# Patient Record
Sex: FEMALE | Race: WHITE | Employment: FULL TIME | URBAN - METROPOLITAN AREA
[De-identification: names, ages, dates, MRNs, and addresses within clinical notes are randomized per-mention and may not be internally consistent; named-entity substitution may affect disease eponyms.]

---

## 2022-08-05 ENCOUNTER — OFFICE VISIT (OUTPATIENT)
Dept: OBGYN CLINIC | Age: 32
End: 2022-08-05
Payer: COMMERCIAL

## 2022-08-05 VITALS
WEIGHT: 199 LBS | SYSTOLIC BLOOD PRESSURE: 110 MMHG | HEIGHT: 62 IN | DIASTOLIC BLOOD PRESSURE: 80 MMHG | BODY MASS INDEX: 36.62 KG/M2

## 2022-08-05 DIAGNOSIS — Z01.419 WOMEN'S ANNUAL ROUTINE GYNECOLOGICAL EXAMINATION: Primary | ICD-10-CM

## 2022-08-05 DIAGNOSIS — E28.2 PCOS (POLYCYSTIC OVARIAN SYNDROME): ICD-10-CM

## 2022-08-05 DIAGNOSIS — B00.9 HSV-2 (HERPES SIMPLEX VIRUS 2) INFECTION: ICD-10-CM

## 2022-08-05 PROCEDURE — 99213 OFFICE O/P EST LOW 20 MIN: CPT | Performed by: OBSTETRICS & GYNECOLOGY

## 2022-08-05 PROCEDURE — 99385 PREV VISIT NEW AGE 18-39: CPT | Performed by: OBSTETRICS & GYNECOLOGY

## 2022-08-05 RX ORDER — FAMOTIDINE 10 MG
TABLET ORAL
COMMUNITY

## 2022-08-05 RX ORDER — CHOLECALCIFEROL (VITAMIN D3) 25 MCG
TABLET ORAL
COMMUNITY

## 2022-08-05 RX ORDER — VALACYCLOVIR HYDROCHLORIDE 500 MG/1
500 TABLET, FILM COATED ORAL 2 TIMES DAILY
Qty: 6 TABLET | Refills: 3 | Status: SHIPPED | OUTPATIENT
Start: 2022-08-05

## 2022-08-05 ASSESSMENT — PATIENT HEALTH QUESTIONNAIRE - PHQ9
2. FEELING DOWN, DEPRESSED OR HOPELESS: 0
SUM OF ALL RESPONSES TO PHQ QUESTIONS 1-9: 0
SUM OF ALL RESPONSES TO PHQ9 QUESTIONS 1 & 2: 0
1. LITTLE INTEREST OR PLEASURE IN DOING THINGS: 0
SUM OF ALL RESPONSES TO PHQ QUESTIONS 1-9: 0

## 2022-08-05 NOTE — PATIENT INSTRUCTIONS
We will call you if anything is abnormal from your testing today. Follow up as needed or next year for your yearly female exam.  Thanks for coming to see us today and letting us take care of you!

## 2022-08-05 NOTE — ASSESSMENT & PLAN NOTE
Pt declines BC (desires preg) and metformin at this time.   Has appt with YARI in the near future but would like to explore this therapy after next pregnancy

## 2022-08-05 NOTE — ASSESSMENT & PLAN NOTE
Exam as below  Encouraged annual exams with paps as indicated  Pt to F/U with PCP for all non-gyn health related issues

## 2022-08-05 NOTE — PROGRESS NOTES
[place] [id number] [facility], [address], [address] Al Delong MD, Edgar San BRANDY-BC  Marcela Ma MD, FACOG      Assessment/Plan     Patient Active Problem List    Diagnosis Date Noted    Women's annual routine gynecological examination 08/05/2022     Priority: Medium     Overview Note:     Pap + HPV done 8/5/22       Assessment & Plan Note:     Exam as below  Encouraged annual exams with paps as indicated  Pt to F/U with PCP for all non-gyn health related issues       HSV-2 (herpes simplex virus 2) infection 08/05/2022     Priority: Medium     Overview Note:     noted       Assessment & Plan Note:     Rare outbreaks - would like prn sympt Rx. Valtrex Rx sent      PCOS (polycystic ovarian syndrome) 08/05/2022     Priority: Medium     Overview Note:     Know Dx from prev Gyn       Assessment & Plan Note:     Pt declines BC (desires preg) and metformin at this time. Has appt with YARI in the near future but would like to explore this therapy after next pregnancy        Problem List Items Addressed This Visit          Endocrine    PCOS (polycystic ovarian syndrome)     Pt declines BC (desires preg) and metformin at this time. Has appt with YARI in the near future but would like to explore this therapy after next pregnancy            Other    Women's annual routine gynecological examination - Primary     Exam as below  Encouraged annual exams with paps as indicated  Pt to F/U with PCP for all non-gyn health related issues          Relevant Orders    PAP IG, Aptima HPV and rfx 16/18,45 ([id number])    HSV-2 (herpes simplex virus 2) infection     Rare outbreaks - would like prn sympt Rx. Valtrex Rx sent             Subjective     LAST PAP:  almost 3 years     LAST MAMMO:  Never     LMP:  Patient's last menstrual period was 07/13/2022 (approximate).      BIRTH CONTROL:  none     TOBACCO USE:  No    Brittaney Baltazar 28 y.o. Kacie Fields presents today for annual Gyn exam.  Patient Allergies   Allergen Reactions    Cefaclor Hives           Review of Systems    Constitutional:  No fevers or chills    Neuro:  No headaches, seizure activity    HEENT: no visual changes, bleeding gums    CV: No chest pain or palpitations    Resp: No SOB or cough    Breast:  No masses, pain, D/C, bleeding    GI:  No nausea/vomiting/diarrhea/constipation    : No dysuria or hematuria    MS:  No back, point pain    Gyn:  No menstrual complaints, pelvic pain    Psych:  No depression, anxiety      Objective       Vitals:    08/05/22 0835   BP: 110/80   Site: Left Upper Arm   Position: Sitting   Weight: 199 lb (90.3 kg)   Height: 5' 2\" (1.575 m)           Physical Exam    General:  well developed, well nourished, in no acute distress     Head:   normocephalic and atraumatic     Mouth:  no deformity or lesions with good dentition     Neck:  no masses, thyromegaly, or abnormal cervical nodes     Chest Wall:  no deformities     Breasts: breast symetrical w/o masses,skin changes,dimpling,or nipple discharge     Lungs:  clear bilaterally with normal respirations     Heart:   regular rate and rhythm, S1, S2 without murmurs     Abdomen:  bowel sounds positive; abdomen soft and non-tender without masses, organomegaly, or hernias noted     Pelvic Exam:       External: normal female genitalia without lesions or masses       Vagina: normal without lesions or masses       Cervix: normal without lesions or masses       Adnexa: normal bimanual exam without masses or fullness       Uterus: normal by palpation       Pap smear: performed     Msk:   no deformity or scoliosis noted with normal posture and gait     Extremities: no clubbing, cyanosis, edema, or deformity noted with normal full range of motion of all joints     Neurologic:  no focal deficits,normal coordination, muscle strength and tone     Skin:   intact without lesions or rashes     Cervical Nodes:  no significant adenopathy     Axillary Nodes:   no significant adenopathy     Psych:  alert and cooperative; normal mood and affect; normal attention span and concentration      Eduardo Delong MD   9:12 AM  08/05/22

## 2022-08-09 LAB
CYTOLOGIST CVX/VAG CYTO: NORMAL
CYTOLOGY CVX/VAG DOC THIN PREP: NORMAL
HPV APTIMA: NEGATIVE
Lab: NORMAL
PATH REPORT.FINAL DX SPEC: NORMAL
STAT OF ADQ CVX/VAG CYTO-IMP: NORMAL

## 2022-08-09 SDOH — HEALTH STABILITY: PHYSICAL HEALTH: ON AVERAGE, HOW MANY DAYS PER WEEK DO YOU ENGAGE IN MODERATE TO STRENUOUS EXERCISE (LIKE A BRISK WALK)?: 1 DAY

## 2022-08-09 SDOH — HEALTH STABILITY: PHYSICAL HEALTH: ON AVERAGE, HOW MANY MINUTES DO YOU ENGAGE IN EXERCISE AT THIS LEVEL?: 30 MIN

## 2022-08-09 ASSESSMENT — SOCIAL DETERMINANTS OF HEALTH (SDOH)

## 2022-08-10 ENCOUNTER — OFFICE VISIT (OUTPATIENT)
Dept: ORTHOPEDIC SURGERY | Age: 32
End: 2022-08-10
Payer: COMMERCIAL

## 2022-08-10 ENCOUNTER — OFFICE VISIT (OUTPATIENT)
Dept: INTERNAL MEDICINE CLINIC | Facility: CLINIC | Age: 32
End: 2022-08-10
Payer: COMMERCIAL

## 2022-08-10 VITALS
SYSTOLIC BLOOD PRESSURE: 123 MMHG | OXYGEN SATURATION: 98 % | DIASTOLIC BLOOD PRESSURE: 88 MMHG | TEMPERATURE: 98.5 F | HEIGHT: 62 IN | HEART RATE: 111 BPM | BODY MASS INDEX: 38.09 KG/M2 | WEIGHT: 207 LBS

## 2022-08-10 VITALS — WEIGHT: 199 LBS | BODY MASS INDEX: 36.62 KG/M2 | HEIGHT: 62 IN

## 2022-08-10 DIAGNOSIS — R19.7 DIARRHEA, UNSPECIFIED TYPE: ICD-10-CM

## 2022-08-10 DIAGNOSIS — Z87.19 HISTORY OF PANCREATITIS: ICD-10-CM

## 2022-08-10 DIAGNOSIS — S92.512A CLOSED DISPLACED FRACTURE OF PROXIMAL PHALANX OF LESSER TOE OF LEFT FOOT, INITIAL ENCOUNTER: Primary | ICD-10-CM

## 2022-08-10 DIAGNOSIS — K21.9 GASTROESOPHAGEAL REFLUX DISEASE WITHOUT ESOPHAGITIS: ICD-10-CM

## 2022-08-10 DIAGNOSIS — Z00.00 ENCOUNTER FOR PREVENTIVE HEALTH EXAMINATION: Primary | ICD-10-CM

## 2022-08-10 DIAGNOSIS — E55.9 VITAMIN D DEFICIENCY: ICD-10-CM

## 2022-08-10 DIAGNOSIS — B00.9 HSV-2 (HERPES SIMPLEX VIRUS 2) INFECTION: ICD-10-CM

## 2022-08-10 PROBLEM — K85.00 IDIOPATHIC PANCREATITIS: Status: ACTIVE | Noted: 2022-08-10

## 2022-08-10 PROCEDURE — 99203 OFFICE O/P NEW LOW 30 MIN: CPT | Performed by: ORTHOPAEDIC SURGERY

## 2022-08-10 PROCEDURE — 99385 PREV VISIT NEW AGE 18-39: CPT | Performed by: INTERNAL MEDICINE

## 2022-08-10 PROCEDURE — L4360 PNEUMAT WALKING BOOT PRE CST: HCPCS | Performed by: ORTHOPAEDIC SURGERY

## 2022-08-10 ASSESSMENT — PATIENT HEALTH QUESTIONNAIRE - PHQ9
1. LITTLE INTEREST OR PLEASURE IN DOING THINGS: 0
SUM OF ALL RESPONSES TO PHQ9 QUESTIONS 1 & 2: 0
SUM OF ALL RESPONSES TO PHQ QUESTIONS 1-9: 0
2. FEELING DOWN, DEPRESSED OR HOPELESS: 0
SUM OF ALL RESPONSES TO PHQ QUESTIONS 1-9: 0

## 2022-08-10 ASSESSMENT — ENCOUNTER SYMPTOMS
RECTAL PAIN: 0
STRIDOR: 0
VOICE CHANGE: 0
EYE PAIN: 0

## 2022-08-10 NOTE — PROGRESS NOTES
NEW PATIENT VISIT    Subjective:    Ms. Humberto Ledezma is a 28 y.o., female,   Chief Complaint   Patient presents with    New Patient     moved from [facility] in July        HPI:    Ms. Humberto Ledezma is a 28 y.o., female who presents today for a new patient appointment. Their previous primary provider was Dr. Sim Sloan in [facility]. Old records have been requested. She has HSV. She takes daily Valtrex for prophylaxis. She has GERD. Symptoms controlled by Pepcid. She had severe idiopathic pancreatitis in 2020 while living in [place]. Lipase was > 10,000. She was hospitalized. She saw gastroenterologists. She was not taking any medications. She was not drinking alcohol. Imaging studies negative for gallbladder disease (US / CT / HIDA scan). MRCP was negative. Cause undetermined. She reports chronic loose stools since her pancreatitis. June 2022 colonoscopy in [place] reportedly normal.  Scheduled to see gastroenterology associates to rule pancreatic exocrine insufficiency. The following portions of the patient's history were reviewed and updated as appropriate:      Past Medical History:   Diagnosis Date    GERD (gastroesophageal reflux disease)     History of positive PPD     Completed 6 months INH age 25. Raised in [place] age 3-12.     HSV-2 infection     Idiopathic pancreatitis     Infertility, female 2017    due to PCOS    PCOS (polycystic ovarian syndrome)     Vitamin D deficiency        Past Surgical History:   Procedure Laterality Date    TONSILLECTOMY      WISDOM TOOTH EXTRACTION         Family History   Problem Relation Age of Onset    Migraines Mother     Migraines Sister     Breast Cancer Neg Hx     Colon Cancer Neg Hx     Ovarian Cancer Neg Hx     Uterine Cancer Neg Hx        Social History     Socioeconomic History    Marital status:      Spouse name: Not on file    Number of children: 1    Years of education: Not on file    Highest education level: Not on file   Occupational History     Comment: RN   Tobacco Use    Smoking status: Never    Smokeless tobacco: Never   Substance and Sexual Activity    Alcohol use: Yes     Comment: Once per month 2 glasses of wine    Drug use: Never    Sexual activity: Yes     Partners: Male   Other Topics Concern    Not on file   Social History Narrative    Abuse: Feels safe at home, no history of physical abuse, no history of sexual abuse      Social Determinants of Health     Financial Resource Strain: Not on file   Food Insecurity: Not on file   Transportation Needs: Not on file   Physical Activity: Insufficiently Active    Days of Exercise per Week: 1 day    Minutes of Exercise per Session: 30 min   Stress: Not on file   Social Connections: Not on file   Intimate Partner Violence: Not At Risk    Fear of Current or Ex-Partner: No    Emotionally Abused: No    Physically Abused: No    Sexually Abused: No   Housing Stability: Not on file       Current Outpatient Medications   Medication Sig Dispense Refill    Cholecalciferol (VITAMIN D3) 25 MCG TABS       famotidine (PEPCID) 10 MG tablet       valACYclovir (VALTREX) 500 MG tablet Take 1 tablet by mouth in the morning and 1 tablet before bedtime. 6 tablet 3     No current facility-administered medications for this visit. Allergies as of 08/10/2022 - Fully Reviewed 08/10/2022   Allergen Reaction Noted    Cefaclor Hives 08/03/2022       Review of Systems   Constitutional:  Negative for activity change and appetite change. HENT:  Negative for drooling and voice change. Eyes:  Negative for pain. Respiratory:  Negative for stridor. Gastrointestinal:  Negative for rectal pain. Endocrine: Negative for polydipsia and polyphagia. Genitourinary:  Negative for dyspareunia and enuresis. Musculoskeletal:  Negative for gait problem and neck stiffness. Skin:  Negative for pallor. Neurological:  Negative for facial asymmetry and speech difficulty.    Hematological:  Does not bruise/bleed easily. Psychiatric/Behavioral:  Negative for self-injury. The patient is not hyperactive. Patient Care Team:  Laureano Hendrix MD as PCP - General (Internal Medicine)  None Provider    Objective:    /88 (Site: Left Upper Arm, Position: Sitting)   Pulse (!) 111   Temp 98.5 °F (36.9 °C) (Temporal)   Ht 5' 2\" (1.575 m)   Wt 207 lb (93.9 kg) Comment: pt arrived wearing a boot L lower leg  LMP 07/23/2022   SpO2 98%   BMI 37.86 kg/m²     Physical Exam  Vitals reviewed. Constitutional:       General: She is not in acute distress. Appearance: She is not toxic-appearing. HENT:      Head: Normocephalic and atraumatic. Right Ear: Tympanic membrane, ear canal and external ear normal.      Left Ear: Tympanic membrane, ear canal and external ear normal.      Nose: Nose normal.      Mouth/Throat:      Mouth: Mucous membranes are moist.      Pharynx: Oropharynx is clear. Eyes:      General: No scleral icterus. Extraocular Movements: Extraocular movements intact. Pupils: Pupils are equal, round, and reactive to light. Cardiovascular:      Rate and Rhythm: Normal rate and regular rhythm. Pulses: Normal pulses. Heart sounds: Normal heart sounds. Pulmonary:      Effort: Pulmonary effort is normal. No respiratory distress. Breath sounds: Normal breath sounds. No stridor. Abdominal:      General: Abdomen is flat. Bowel sounds are normal.      Palpations: Abdomen is soft. There is no mass. Tenderness: There is no guarding or rebound. Musculoskeletal:         General: Normal range of motion. Cervical back: Normal range of motion and neck supple. Skin:     General: Skin is warm and dry. Coloration: Skin is not jaundiced. Neurological:      Mental Status: She is alert and oriented to person, place, and time. Mental status is at baseline. Psychiatric:         Behavior: Behavior normal.         Thought Content:  Thought content normal. Office Visit on 2022   Component Date Value Ref Range Status    Diagnosis: 2022 Comment    Final    Comment: (NOTE)  NEGATIVE FOR INTRAEPITHELIAL LESION OR MALIGNANCY. Specimen adequacy: 2022 Comment    Final    Comment: (NOTE)  Satisfactory for evaluation. Endocervical and/or squamous metaplastic  cells (endocervical component) are present. Performed by: 2022 Comment    Final    Comment: (NOTE)  Susan Lyons, Cytotechnologist (ASCP)      Note: 2022 Comment    Final    Comment: (NOTE)  The Pap smear is a screening test designed to aid in the detection of  premalignant and malignant conditions of the uterine cervix. It is  not a diagnostic procedure and should not be used as the sole means  of detecting cervical cancer. Both false-positive and false-negative  reports do occur. Methodology: 2022 Comment    Final    Comment: (NOTE)  This liquid based ThinPrep(R) pap test was screened with the  use of an image guided system. HPV Aptima 2022 Negative  Negative   Final    Comment: (NOTE)  This nucleic acid amplification test detects fourteen high-risk  HPV types (16,18,31,33,35,39,45,51,52,56,58,59,66,68) without  differentiation. No. of containers. Penny Bullock 01 ThinPrep Vial  Performed At: [address] [id number]  Zabrina North MD T  Performed At: [address] [id number]  Zabrina North MD AA:[id number]           [address] [place] [address] [place]:        1. Encounter for preventive health examination  Overview:  8/10/22 The patient was seen today for the annual preventive health visit. The patient was provided anticipatory guidance and counseling regarding age / sex appropriate health maintenance issues including vaccinations, blood pressure screening, lipid screening, cancer screenings, diet, exercise, avoidance of tobacco / substance abuse.    Orders:  -     Comprehensive Metabolic Panel; Future  -     TSH; Future  -     Lipid Panel; Future  -     CBC with Auto Differential; Future  2. History of pancreatitis  Overview:  She had severe idiopathic pancreatitis in 2020 while living in [place]. Lipase was > 10,000. She was hospitalized. She saw gastroenterologists. She was not taking any medications. She was not drinking alcohol. Imaging studies negative for gallbladder disease (US / CT / HIDA scan). MRCP was negative. Cause undetermined. 3. Vitamin D deficiency  Overview:  Continue OTC vitamin D. Orders:  -     Vitamin D 25 Hydroxy; Future  4. Diarrhea, unspecified type  Overview: Following idiopathic pancreatitis in 2020. To be evaluated by gastroenterology. 5. HSV-2 (herpes simplex virus 2) infection  Overview:  Continue prophylactic Valtrex. 6. Gastroesophageal reflux disease without esophagitis  Overview:  Symptoms controlled by Pepcid. The patient and/or patient representative voiced understanding and agreement with the current diagnoses, recommendations, and possible side effects. Return in about 1 year (around 8/10/2023) for annual wellness, review labs.

## 2022-08-10 NOTE — PROGRESS NOTES
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 She has never used smokeless tobacco.     Physical Examination:  Patient appears to be alert and oriented with acceptable appearance. 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 We explored treatment options.      I discussed the position of her fracture and that it is intra-articular and still attached to her tibial collateral ligament  We discussed surgical treatment, but less likely future need for surgery  We discussed foot and toe care as well as toe taped protection  I outlined and demonstrated taping maneuvers today  Advanced medical imaging: No indication MRI scan  DME: Placed in 3D boot     Medication - OTC meds prn:     Surgical discussion: Discussed surgical treatment but no indication today as her fracture and toe appear to be stable  Follow up: 3 weeks for x-rays or as needed  Work status: For 3 weeks, 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

## 2022-08-10 NOTE — LETTER
up to 14 days after purchasing. [address] will replace items that are defective.    ______ (Patient Initials) I understand that Laura Hall will not file a claim with my insurance carrier for this service/product and I am waiving my right to file a claim on my own for this service/product with my insurance company as this item is NON-COVERED (Denoted by the **) by my Insurance company/policy. ______ (Patient Initials) I understand that I am responsible to bring my equipment to the hospital for any surgery. ______________________________________________  ________________________  Patient / Melissa July            Thank you for considering [address] Your physician has prescribed specific medical equipment or devices for your home use. The following describes your rights and responsibilities as our customer. Right to Choose Providers: You have a choice regarding which company supplies your home medical equipment and devices, and to consult your physician in this decision. You may choose a medical supply store, a home medical equipment provider, or a specialist such as [facility]. [facility] will coordinate with your physician to provide the medical equipment or devices prescribed for your home use. Right to Service:  You have the right to considerate, respectful and nondiscriminatory care. You have the right to receive accurate and easily understood information about your health care. If you speak a foreign language, or don't understand the discussions, assistance will be provided to allow you to make informed health care decisions. You have the right to know your treatment options and to participate in decisions about your care, including the right to accept or refuse treatment.   You have the right to expect a reasonable response to your requests for treatment or service. You have the right to talk in confidence with health care providers and to have your health care information protected. You have the right to receive an explanation of your bill. You have the right to complain about the service or product you receive. Patient Responsibilities:  Please provide complete and accurate information about your health insurance benefits and make arrangements for the timely payment of your bill. [facility] will, if possible, assume responsibility for billing your insurance (Medicare, Medicaid and commercial) for the prescribed equipment or devices. If your policy does not cover the prescribed product, or only covers a portion of the bill, you are responsible for any remaining balance. Return and Exchange Policy:  [facility] will honor published  Warranties for products. [facility] will accept returns or exchanges within 14 days from the date of receipt, providin) the product must be in new condition; 2) receipt as required; and 3) used disposable and hygiene products may only be returned due to a defective product. Note: Refunds will be issued in a timely manner, please allow 4-6 weeks for processing. Complaint Procedures and DME Consumer Protection Resources:  [facility] values you as a customer, and is committed to resolving patient concerns. This commitment includes understanding and documenting your concerns, conducting a review of internal procedures, and providing you with an explanation and resolution to your concerns. Should you have any questions about our services or billing process, please contact our office at (practice phone number). If we are unable to resolve the concern, you have the right to direct comments to the office of Consumer Protection, in the [address] or the [employer]  office, without fear of repercussion.     DMEPOS SUPPLIER STANDARDS    A supplier must be in compliance with all applicable Federal and State licensure and regulatory requirements. A supplier must provide complete and accurate information on the DMEPOS supplier application. Any changes to this information must be reported to the [facility] within 30 days. An authorized individual (one whose signature is binding) must sign the application for billing privileges. A supplier must fill orders from its own inventory, or must contract with other companies for the purchase of items necessary to fill the order. A supplier may not contract with any entity that is currently excluded from the Medicare program, any [facility] program, or from any other Federal procurement or Nonprocurement programs. A supplier must advise beneficiaries that they may rent or purchase inexpensive or routinely purchased durable medical equipment, and of the purchase option for capped rental equipment. A supplier must notify beneficiaries of warranty coverage and honor all warranties under applicable State Law, and repair or replace free of charge Medicare covered items that are under warranty. A supplier must maintain a physical facility on an appropriate site. A supplier must permit CMS, or its agents to conduct on-site inspections to ascertain the supplier's compliance with these standards. The supplier location must be accessible to beneficiaries during reasonable business hours, and must maintain a visible sign and posted hours of operation. A supplier must maintain a primary business telephone listed under the name of the business in a [employer] or a toll free number available through directory assistance. The exclusive use of a beeper, answering machine or cell phone is prohibited. A supplier must have comprehensive liability insurance in the amount of at least $300,000 that covers both the supplier's place of business and all customers and employees of the supplier.   If the supplier manufactures its own items, this insurance must also cover product liability and completed operations. A supplier must agree not to initiate telephone contact with beneficiaries, with a few exceptions allowed. This standard prohibits suppliers from calling beneficiaries in order to solicit new business. A supplier is responsible for delivery and must instruct beneficiaries on use of Medicare covered items, and maintain proof of delivery. A supplier must answer questions, and respond to complaints of the beneficiaries, and maintain documentation of such contacts. A supplier must maintain and replace at no charge or repair directly, or through a service contract with another company, Medicare covered items it has rented to beneficiaries. A supplier must accept returns of substandard (less than full quality for the particular item) or unsuitable items (inappropriate for the beneficiary at the time it was fitted and rented or sold) from beneficiaries. A supplier must disclose these supplier standards to each beneficiary to whom it supplies a Medicare-covered item. A supplier must disclose to the government any person having ownership, financial, or control interest in the supplier. A supplier must not convey or reassign a supplier number; i.e., the supplier may not sell or allow another entity to use its Medicare billing number. A supplier must have a complaint resolution protocol established to address beneficiary complaints that relate to these standards. A record of these complaints must be maintained at the physical facility. Complaint records must include: the name, address, telephone number and health insurance claim number of the beneficiary, a summary of the complaint, and any action taken to resolve it. A supplier must agree to furnish CMS any information required by the Medicare statute and implementing regulations.   A supplier of DMEPOS and other items and services must be accredited by a CMS-approved accreditation organization in order to receive and retain a supplier billing number. The accreditation must indicate the specific products and services, for which the supplier is accredited in order for the supplier to receive payment for those specific products and services. A DMEPOS supplier must notify their accreditation organization when a new DMEPOS location is opened. The accreditation organization may accredit the new supplier location for three months after it is operational without requiring a new site visit. All DMEPOS supplier locations, whether owned or subcontracted, must meet the [employer] and be separately accredited in order to bill Medicare. An accredited supplier may be denied enrollment or their enrollment may be revoked, if CMS determines that they are not in compliance with the DMEPOS quality standards. A DMEPOS supplier must disclose upon enrollment all products and services, including the addition of new product lines for which they are seeking accreditation. If a new product line is added after enrollment, the DMEPOS supplier will be responsible for notifying the accrediting body of the new product so that the DMEPOS supplier can be re-surveyed and accredited for these new products. Must meet the surety bond requirements specified in 42 C. F.R. 424.57(c). Implementation date- May 4, 2009. A supplier must obtain oxygen from a state-licensed oxygen supplier. A supplier must maintain ordering and referring documentation consistent with provisions found in 42 C. F.R. 424.516(f). DMEPOS suppliers are prohibited from sharing a practice location with certain other Medicare providers and suppliers. DMEPOS suppliers must remain open to the public for a minimum of 30 hours per week with certain exceptions.

## 2022-08-10 NOTE — LETTER
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     Sincerely,        Annie Baer MD

## 2022-08-10 NOTE — PROGRESS NOTES
The patient was prescribed a walker boot for the patient's left foot. The patient wears a size 6.5 shoe and I fitted them with a M size boot. The patient was fitted and instructed on the use of prescribed walker boot. I explained how to fit themselves and that the plastic flexible piece should always be on the front of the boot and secured by the Velcro straps on top. The air bladder in the boot was adjusted according to proper fit and comfort. The patient walked a short distance and acknowledged satisfaction with current fit. I also explained that they need a heel lift or a higher heeled shoe for the uninvolved LE to help normalize gait and avoid excessive low back stress/strain due to leg length inequality created from walker boot. Patient read and signed documenting they understand and agree to [facility]'s current DME return policy.

## 2022-08-19 DIAGNOSIS — E55.9 VITAMIN D DEFICIENCY: ICD-10-CM

## 2022-08-19 DIAGNOSIS — Z00.00 ENCOUNTER FOR PREVENTIVE HEALTH EXAMINATION: ICD-10-CM

## 2022-08-19 LAB
25(OH)D3 SERPL-MCNC: 27.2 NG/ML (ref 30–100)
ALBUMIN SERPL-MCNC: 3.8 G/DL (ref 3.5–5)
ALBUMIN/GLOB SERPL: 1 {RATIO} (ref 1.2–3.5)
ALP SERPL-CCNC: 98 U/L (ref 50–136)
ALT SERPL-CCNC: 25 U/L (ref 12–65)
ANION GAP SERPL CALC-SCNC: 6 MMOL/L (ref 7–16)
AST SERPL-CCNC: 18 U/L (ref 15–37)
BASOPHILS # BLD: 0 K/UL (ref 0–0.2)
BASOPHILS NFR BLD: 1 % (ref 0–2)
BILIRUB SERPL-MCNC: 0.4 MG/DL (ref 0.2–1.1)
BUN SERPL-MCNC: 10 MG/DL (ref 6–23)
CALCIUM SERPL-MCNC: 9 MG/DL (ref 8.3–10.4)
CHLORIDE SERPL-SCNC: 110 MMOL/L (ref 98–107)
CHOLEST SERPL-MCNC: 184 MG/DL
CO2 SERPL-SCNC: 24 MMOL/L (ref 21–32)
CREAT SERPL-MCNC: 0.8 MG/DL (ref 0.6–1)
DIFFERENTIAL METHOD BLD: NORMAL
EOSINOPHIL # BLD: 0.1 K/UL (ref 0–0.8)
EOSINOPHIL NFR BLD: 2 % (ref 0.5–7.8)
ERYTHROCYTE [DISTWIDTH] IN BLOOD BY AUTOMATED COUNT: 13.8 % (ref 11.9–14.6)
GLOBULIN SER CALC-MCNC: 3.7 G/DL (ref 2.3–3.5)
GLUCOSE SERPL-MCNC: 86 MG/DL (ref 65–100)
HCT VFR BLD AUTO: 46.3 % (ref 35.8–46.3)
HDLC SERPL-MCNC: 57 MG/DL (ref 40–60)
HDLC SERPL: 3.2 {RATIO}
HGB BLD-MCNC: 14.7 G/DL (ref 11.7–15.4)
IMM GRANULOCYTES # BLD AUTO: 0 K/UL (ref 0–0.5)
IMM GRANULOCYTES NFR BLD AUTO: 0 % (ref 0–5)
LDLC SERPL CALC-MCNC: 97.6 MG/DL
LYMPHOCYTES # BLD: 2.3 K/UL (ref 0.5–4.6)
LYMPHOCYTES NFR BLD: 34 % (ref 13–44)
MCH RBC QN AUTO: 29.7 PG (ref 26.1–32.9)
MCHC RBC AUTO-ENTMCNC: 31.7 G/DL (ref 31.4–35)
MCV RBC AUTO: 93.5 FL (ref 79.6–97.8)
MONOCYTES # BLD: 0.5 K/UL (ref 0.1–1.3)
MONOCYTES NFR BLD: 7 % (ref 4–12)
NEUTS SEG # BLD: 3.9 K/UL (ref 1.7–8.2)
NEUTS SEG NFR BLD: 56 % (ref 43–78)
NRBC # BLD: 0 K/UL (ref 0–0.2)
PLATELET # BLD AUTO: 315 K/UL (ref 150–450)
PMV BLD AUTO: 10.4 FL (ref 9.4–12.3)
POTASSIUM SERPL-SCNC: 4.2 MMOL/L (ref 3.5–5.1)
PROT SERPL-MCNC: 7.5 G/DL (ref 6.3–8.2)
RBC # BLD AUTO: 4.95 M/UL (ref 4.05–5.2)
SODIUM SERPL-SCNC: 140 MMOL/L (ref 136–145)
TRIGL SERPL-MCNC: 147 MG/DL (ref 35–150)
TSH, 3RD GENERATION: 2.23 UIU/ML (ref 0.36–3.74)
VLDLC SERPL CALC-MCNC: 29.4 MG/DL (ref 6–23)
WBC # BLD AUTO: 6.9 K/UL (ref 4.3–11.1)

## 2022-09-04 PROBLEM — Z01.419 WOMEN'S ANNUAL ROUTINE GYNECOLOGICAL EXAMINATION: Status: RESOLVED | Noted: 2022-08-05 | Resolved: 2022-09-04

## 2022-09-09 PROBLEM — Z00.00 ENCOUNTER FOR PREVENTIVE HEALTH EXAMINATION: Status: RESOLVED | Noted: 2022-08-10 | Resolved: 2022-09-09

## 2022-12-08 ENCOUNTER — TELEPHONE (OUTPATIENT)
Dept: OBGYN CLINIC | Age: 32
End: 2022-12-08

## 2022-12-08 ENCOUNTER — PATIENT MESSAGE (OUTPATIENT)
Dept: OBGYN CLINIC | Age: 32
End: 2022-12-08

## 2022-12-08 RX ORDER — ONDANSETRON 4 MG/1
4 TABLET, FILM COATED ORAL EVERY 8 HOURS PRN
Qty: 30 TABLET | Refills: 1 | Status: SHIPPED | OUTPATIENT
Start: 2022-12-08

## 2022-12-08 RX ORDER — ONDANSETRON 4 MG/1
4 TABLET, ORALLY DISINTEGRATING ORAL 3 TIMES DAILY PRN
Qty: 28 TABLET | Refills: 1 | Status: SHIPPED | OUTPATIENT
Start: 2022-12-08

## 2022-12-08 NOTE — TELEPHONE ENCOUNTER
Patient has already been messaged through [address] regarding their concern of nausea/vomiting and Rx has been sent with patient acknowledgement.

## 2022-12-08 NOTE — TELEPHONE ENCOUNTER
RN sent message stating the physician sent in rx of Zofran to assist w/ patient nausea/vomiting. Patient acknowledged message with replying.

## 2022-12-08 NOTE — TELEPHONE ENCOUNTER
----- Message from Ascencion Adames sent at 12/8/2022  9:34 AM EST -----  Regarding: Nausea/vomiting  Hello,    I am currently taking bongesta for morning sickness and I am still having vomiting daily and extreme nausea. I have reglan po that is giving me no relief. I am unable to make my appointment today and had to reschedule for the 19th. Is there any possible way I could try zofran?

## 2022-12-12 ENCOUNTER — TELEPHONE (OUTPATIENT)
Dept: OBGYN CLINIC | Age: 32
End: 2022-12-12

## 2022-12-12 NOTE — TELEPHONE ENCOUNTER
Patient states zofran rx does not have quantity in prescription and the pharmacy can not fill it out. What quantity do you want for the patient?

## 2022-12-12 NOTE — TELEPHONE ENCOUNTER
RN called to verify pharmacy they wanted medication switched over to. Patient verified they wanted [facility] in [place]. RN stated they would get Zofran rx switched over.

## 2022-12-12 NOTE — TELEPHONE ENCOUNTER
----- Message from Jose E Bishop sent at 12/9/2022  3:57 PM EST -----  Regarding: Nausea/vomiting  Hello,   The pharmacy stated the zofran did not have a quantity so they cant fulfill the script. Is that something that could get called over?

## 2022-12-12 NOTE — TELEPHONE ENCOUNTER
RN called patient, patient verified PHIs, RN proceeded to update patient that their chosen pharmacy had issues filling their prescription due to insurance coverage. RN gave the suggestion that was stated by their pharmacy to either use discount codes/coupons or switch to a different pharmacy that would have a better chance of covering the medication. Patient states understanding and stated they would call their pharmacy and resolve the Rx.

## 2022-12-19 ENCOUNTER — ROUTINE PRENATAL (OUTPATIENT)
Dept: OBGYN CLINIC | Age: 32
End: 2022-12-19
Payer: COMMERCIAL

## 2022-12-19 VITALS
DIASTOLIC BLOOD PRESSURE: 86 MMHG | WEIGHT: 199 LBS | SYSTOLIC BLOOD PRESSURE: 122 MMHG | HEIGHT: 62 IN | BODY MASS INDEX: 36.62 KG/M2

## 2022-12-19 DIAGNOSIS — Z34.81 MULTIGRAVIDA IN FIRST TRIMESTER: ICD-10-CM

## 2022-12-19 DIAGNOSIS — B00.9 HERPES INFECTION IN PREGNANCY, FIRST TRIMESTER: ICD-10-CM

## 2022-12-19 DIAGNOSIS — O98.511 HERPES INFECTION IN PREGNANCY, FIRST TRIMESTER: ICD-10-CM

## 2022-12-19 DIAGNOSIS — O36.80X0 ENCOUNTER TO DETERMINE FETAL VIABILITY OF PREGNANCY, SINGLE OR UNSPECIFIED FETUS: Primary | ICD-10-CM

## 2022-12-19 PROBLEM — R19.7 FREQUENT LOOSE STOOLS: Status: RESOLVED | Noted: 2022-08-10 | Resolved: 2022-12-19

## 2022-12-19 PROBLEM — K21.9 GASTROESOPHAGEAL REFLUX DISEASE WITHOUT ESOPHAGITIS: Status: RESOLVED | Noted: 2022-08-10 | Resolved: 2022-12-19

## 2022-12-19 PROBLEM — Z87.19 HISTORY OF PANCREATITIS: Status: RESOLVED | Noted: 2022-08-10 | Resolved: 2022-12-19

## 2022-12-19 PROBLEM — E55.9 VITAMIN D DEFICIENCY: Status: RESOLVED | Noted: 2022-08-10 | Resolved: 2022-12-19

## 2022-12-19 PROBLEM — E28.2 PCOS (POLYCYSTIC OVARIAN SYNDROME): Status: RESOLVED | Noted: 2022-08-05 | Resolved: 2022-12-19

## 2022-12-19 LAB
ABO + RH BLD: NORMAL
BLOOD GROUP ANTIBODIES SERPL: NORMAL
ERYTHROCYTE [DISTWIDTH] IN BLOOD BY AUTOMATED COUNT: 13.4 % (ref 11.9–14.6)
HCT VFR BLD AUTO: 42.7 % (ref 35.8–46.3)
HGB BLD-MCNC: 14.2 G/DL (ref 11.7–15.4)
MCH RBC QN AUTO: 31.1 PG (ref 26.1–32.9)
MCHC RBC AUTO-ENTMCNC: 33.3 G/DL (ref 31.4–35)
MCV RBC AUTO: 93.4 FL (ref 82–102)
NRBC # BLD: 0 K/UL (ref 0–0.2)
PLATELET # BLD AUTO: 296 K/UL (ref 150–450)
PMV BLD AUTO: 10.7 FL (ref 9.4–12.3)
RBC # BLD AUTO: 4.57 M/UL (ref 4.05–5.2)
WBC # BLD AUTO: 7.2 K/UL (ref 4.3–11.1)

## 2022-12-19 PROCEDURE — 99902 PR PRENATAL VISIT: CPT | Performed by: NURSE PRACTITIONER

## 2022-12-19 PROCEDURE — 76801 OB US < 14 WKS SINGLE FETUS: CPT | Performed by: NURSE PRACTITIONER

## 2022-12-19 RX ORDER — DOXYLAMINE SUCCINATE AND PYRIDOXINE HYDROCHLORIDE 20; 20 MG/1; MG/1
TABLET, EXTENDED RELEASE ORAL
COMMUNITY
Start: 2022-11-23

## 2022-12-19 NOTE — PROGRESS NOTES
Patient comes in today for initial prenatal visit. Pt reports she has had constipation. Fetal Movements:  No  Contractions:  No  Vaginal Bleeding:  No  Leaking Fluid:  No  GI/ issues:  Yes    Drug/Alcohol 4P's Plus Screening    1. Have either of your parents ever had a problem with drugs/alcohol/prescription drugs? Yes  2. Does your partner have a problem with drugs/alcohol/prescription drugs? No  3. In the past, have you ever had a problem with drugs/alcohol/prescription drugs? No  4. Before you were pregnant, in the past month, have you done any drugs, drank any alcohol or abused any prescription drugs? No  If \"YES\" to any of the above, please give further details:  Pt father is alcoholic and abuses marijuana. LAST PAP:  08/05/2022 Neg, Neg     LAST MAMMO:  Never     LMP:  Patient's last menstrual period was 10/08/2022 (exact date).     FAMILY HISTORY OF:   Breast Cancer:  No   Ovarian Cancer:  No   Uterine Cancer:  No   Colon Cancer:  No    Vitals:    12/19/22 0932   BP: 122/86   Site: Left Upper Arm   Position: Sitting   Weight: 199 lb (90.3 kg)   Height: 5' 2\" (1.575 m)        Nawaf Anderson MA  12/19/22  9:46 AM

## 2022-12-19 NOTE — ASSESSMENT & PLAN NOTE
History reviewed  PNV's ordered (if not already taking)  PN labs, UDS ordered  Problem list reviewed  OB physical completed  OB prenatal packet/education reviewed: Information included discusses general pregnancy topics, fetal development, weight gain, nutrition, breastfeeding, risky behaviors, WIC, vaccinations and hospital information. RTO 4 weeks  PTL/labor precautions, [address], and pregnancy warning signs reviewed. Genetic testing - D/W pt at length genetic testing that is recommended by ACOG -- NIPT, Quad screen (for trisomies and NTD), CF, SMA. Brief discussion of these diseases - conditions that may increase risks, etiology, carrier states, fetal effects, treatment options, etc was undertaken. D/W pt that these are screening tests/carrier screening test only and are NOT mandatory. We also discuss false POS/false neg rates. It is her decision whether to have them done and how to proceed with the information afterwards.

## 2022-12-19 NOTE — PROGRESS NOTES
I have reviewed the patient's visit today including history, exam and assessment by JEANETTE Moreira. I agree with treatment/plan as above.     Susanna Cruz MD  10:30 AM  12/19/22

## 2022-12-19 NOTE — PROGRESS NOTES
HPI    This is a 28 y.o.   at 10w2d for new OB visit. Her Estimated Due Date is 7/15/2023, by Last Menstrual Period            Denies leaking of fluid, vaginal bleeding, or regular contractions. Pt c/o constipation. Relief measures reviewed      Current Outpatient Medications on File Prior to Visit   Medication Sig Dispense Refill    BONJESTA 20-20 MG TBCR TAKE ONE TABLET BY MOUTH IN THE EVENING. if symptoms persist, MAY take ONE TABLET IN THE MORNING AND IN THE EVENING.  FOLIC ACID PO Take by mouth      ondansetron (ZOFRAN-ODT) 4 MG disintegrating tablet Take 1 tablet by mouth 3 times daily as needed for Nausea or Vomiting 28 tablet 1    ondansetron (ZOFRAN) 4 MG tablet Take 1 tablet by mouth every 8 hours as needed for Nausea or Vomiting 30 tablet 1    Cholecalciferol (VITAMIN D3) 25 MCG TABS       famotidine (PEPCID) 10 MG tablet  (Patient not taking: Reported on 2022)      valACYclovir (VALTREX) 500 MG tablet Take 1 tablet by mouth in the morning and 1 tablet before bedtime. (Patient not taking: Reported on 2022) 6 tablet 3     No current facility-administered medications on file prior to visit. Allergies   Allergen Reactions    Cefaclor Hives           OB History    Para Term  AB Living   2 1 1 0 0 1   SAB IAB Ectopic Molar Multiple Live Births   0 0 0 0 0 1       # 1 - Date: 20, Sex: Male, Weight: 7 lb 9 oz (3.43 kg), GA: 39w5d, Delivery: Vaginal, Spontaneous, Apgar1: None, Apgar5: None, Living: Living, Birth Comments: None    # 2 - Date: None, Sex: None, Weight: None, GA: None, Delivery: None, Apgar1: None, Apgar5: None, Living: None, Birth Comments: None          Past Medical History:   Diagnosis Date    Gastroesophageal reflux disease without esophagitis 8/10/2022    Symptoms controlled by Pepcid.      GERD (gastroesophageal reflux disease)     History of pancreatitis 8/10/2022    She had severe idiopathic pancreatitis in  while living in [place]. Lipase was > 10,000. She was hospitalized. She saw gastroenterologists. She was not taking any medications. She was not drinking alcohol. Imaging studies negative for gallbladder disease (US / CT / HIDA scan). MRCP was negative. Cause undetermined.  History of positive PPD     Completed 6 months INH age 25. Raised [place] age 3-12.     HSV-2 infection     Idiopathic pancreatitis     Infertility, female 2017    due to PCOS    PCOS (polycystic ovarian syndrome)     Tachycardia     Pregnancy induced    Vitamin D deficiency        Past Surgical History:   Procedure Laterality Date    TONSILLECTOMY      WISDOM TOOTH EXTRACTION         Family History   Problem Relation Age of Onset   [de-identified] Migraines Mother     Migraines Sister     Breast Cancer Neg Hx     Colon Cancer Neg Hx     Ovarian Cancer Neg Hx     Uterine Cancer Neg Hx        Social History     Socioeconomic History    Marital status:      Spouse name: Not on file    Number of children: 1    Years of education: Not on file    Highest education level: Not on file   Occupational History     Comment: RN   Tobacco Use    Smoking status: Never    Smokeless tobacco: Never   Substance and Sexual Activity    Alcohol use: Yes     Comment: Once per month 2 glasses of wine    Drug use: Never    Sexual activity: Yes     Partners: Male   Other Topics Concern    Not on file   Social History Narrative    Abuse: Feels safe at home, no history of physical abuse, no history of sexual abuse      Social Determinants of Health     Financial Resource Strain: Not on file   Food Insecurity: Not on file   Transportation Needs: Not on file   Physical Activity: Insufficiently Active    Days of Exercise per Week: 1 day    Minutes of Exercise per Session: 30 min   Stress: Not on file   Social Connections: Not on file   Intimate Partner Violence: Not At Risk    Fear of Current or Ex-Partner: No    Emotionally Abused: No    Physically Abused: No    Sexually Abused: No   Housing Stability: Not on file           Objective        Vitals:    12/19/22 0932   BP: 122/86   Site: Left Upper Arm   Position: Sitting   Weight: 199 lb (90.3 kg)   Height: 5' 2\" (1.575 m)           General: well developed, well nourished, in no acute distress    Head: normocephalic and atraumatic    Resp: even and unlabored    Psych: Normal mood and affect        Assessment and Plan    More than 50% of this 30 minute visit was spent counseling the patient on pregnancy expectations. Patient Active Problem List    Diagnosis Date Noted    Multigravida in first trimester 12/19/2022     Priority: Medium     Overview Note:     SIERRA 7/15/2023 by LMP c/w 10 wk US       Assessment & Plan Note:     History reviewed  PNV's ordered (if not already taking)  PN labs, UDS ordered  Problem list reviewed  OB physical completed  OB prenatal packet/education reviewed: Information included discusses general pregnancy topics, fetal development, weight gain, nutrition, breastfeeding, risky behaviors, WIC, vaccinations and hospital information. RTO 4 weeks  PTL/labor precautions, [address], and pregnancy warning signs reviewed. Genetic testing - D/W pt at length genetic testing that is recommended by ACOG -- NIPT, Quad screen (for trisomies and NTD), CF, SMA. Brief discussion of these diseases - conditions that may increase risks, etiology, carrier states, fetal effects, treatment options, etc was undertaken. D/W pt that these are screening tests/carrier screening test only and are NOT mandatory. We also discuss false POS/false neg rates. It is her decision whether to have them done and how to proceed with the information afterwards.  Herpes infection in pregnancy, first trimester 08/05/2022     Overview Note:     12/19/22: Rare outbreaks, pt has rx if needed.  Plan prophylaxis at 35 weeks or sooner with outbreak          Problem List Items Addressed This Visit        Other Multigravida in first trimester     History reviewed  PNV's ordered (if not already taking)  PN labs, UDS ordered  Problem list reviewed  OB physical completed  OB prenatal packet/education reviewed: Information included discusses general pregnancy topics, fetal development, weight gain, nutrition, breastfeeding, risky behaviors, WIC, vaccinations and hospital information. RTO 4 weeks  PTL/labor precautions, [address], and pregnancy warning signs reviewed. Genetic testing - D/W pt at length genetic testing that is recommended by ACOG -- NIPT, Quad screen (for trisomies and NTD), CF, SMA. Brief discussion of these diseases - conditions that may increase risks, etiology, carrier states, fetal effects, treatment options, etc was undertaken. D/W pt that these are screening tests/carrier screening test only and are NOT mandatory. We also discuss false POS/false neg rates. It is her decision whether to have them done and how to proceed with the information afterwards.                  Relevant Orders    ABO/Rh    Antibody Screen    CBC    Hepatitis C Antibody    Hemoglobinopathy Evaluation    Hepatitis B Surface Antigen    HIV 1/2 Ag/Ab, 4TH Generation,W Rflx Confirm    Rubella antibody, IgG    RPR    Hemoglobin A1C    Urine Drug Screen    Chlamydia, Gonorrhea, Trichomoniasis    Herpes infection in pregnancy, first trimester   Other Visit Diagnoses     Encounter to determine fetal viability of pregnancy, single or unspecified fetus    -  Primary    Relevant Orders    AMB POC US OB < 14 WKS, 1ST GESTATION (Completed)          Orders Placed This Encounter   Procedures    Chlamydia, Gonorrhea, Trichomoniasis    AMB POC US OB < 14 WKS, 1ST GESTATION    CBC    Hepatitis C Antibody    Hemoglobinopathy Evaluation    Hepatitis B Surface Antigen    HIV 1/2 Ag/Ab, 4TH Generation,W Rflx Confirm    Rubella antibody, IgG    RPR    Hemoglobin A1C    Urine Drug Screen    ABO/Rh    Antibody Screen       Outpatient Encounter Medications as of 12/19/2022   Medication Sig Dispense Refill    BONJESTA 20-20 MG TBCR TAKE ONE TABLET BY MOUTH IN THE EVENING. if symptoms persist, MAY take ONE TABLET IN THE MORNING AND IN THE EVENING.  FOLIC ACID PO Take by mouth      ondansetron (ZOFRAN-ODT) 4 MG disintegrating tablet Take 1 tablet by mouth 3 times daily as needed for Nausea or Vomiting 28 tablet 1    ondansetron (ZOFRAN) 4 MG tablet Take 1 tablet by mouth every 8 hours as needed for Nausea or Vomiting 30 tablet 1    Cholecalciferol (VITAMIN D3) 25 MCG TABS       famotidine (PEPCID) 10 MG tablet  (Patient not taking: Reported on 12/19/2022)      valACYclovir (VALTREX) 500 MG tablet Take 1 tablet by mouth in the morning and 1 tablet before bedtime. (Patient not taking: Reported on 12/19/2022) 6 tablet 3     No facility-administered encounter medications on file as of 12/19/2022.                Labor signs, pregnancy warning signs, and fetal movement counting reviewed (if applicable)            Vivian Wong NP, APRN - CNP 12/19/22 10:07 AM

## 2022-12-20 LAB
AMPHET UR QL SCN: NEGATIVE
BARBITURATES UR QL SCN: NEGATIVE
BENZODIAZ UR QL: NEGATIVE
C TRACH RRNA SPEC QL NAA+PROBE: NEGATIVE
CANNABINOIDS UR QL SCN: NEGATIVE
COCAINE UR QL SCN: NEGATIVE
EST. AVERAGE GLUCOSE BLD GHB EST-MCNC: 100 MG/DL
HBA1C MFR BLD: 5.1 % (ref 4.8–5.6)
HBV SURFACE AG SER QL: NONREACTIVE
HCV AB SER QL: NONREACTIVE
HIV 1+2 AB+HIV1 P24 AG SERPL QL IA: NONREACTIVE
HIV 1/2 RESULT COMMENT: NORMAL
METHADONE UR QL: NEGATIVE
N GONORRHOEA RRNA SPEC QL NAA+PROBE: NEGATIVE
OPIATES UR QL: NEGATIVE
PCP UR QL: NEGATIVE
RPR SER QL: NONREACTIVE
RUBV IGG SERPL IA-ACNC: 321.6 IU/ML (ref 0–50)
SPECIMEN SOURCE: NORMAL
T VAGINALIS RRNA SPEC QL NAA+PROBE: NEGATIVE

## 2022-12-22 LAB
HGB A MFR BLD: 97.5 % (ref 96.4–98.8)
HGB A2 MFR BLD COLUMN CHROM: 2.5 % (ref 1.8–3.2)
HGB F MFR BLD: 0 % (ref 0–2)
HGB FRACT BLD-IMP: NORMAL
HGB S MFR BLD: 0 %

## 2023-01-03 ENCOUNTER — TELEPHONE (OUTPATIENT)
Dept: OBGYN CLINIC | Age: 33
End: 2023-01-03

## 2023-01-03 DIAGNOSIS — Z34.81 MULTIGRAVIDA IN FIRST TRIMESTER: ICD-10-CM

## 2023-01-03 LAB — NIPT, EXTERNAL: NORMAL

## 2023-01-03 NOTE — TELEPHONE ENCOUNTER
RN called patient and updated them on genetic lab results. Patient saw that the gender of the baby is a boy. Patient stated no other questions/concerns regarding genetic screening/gender. Patient stated that they were recently dx w/ UTI and is on amoxicillin and stated that they are going to start 7 day monistat to prevent yeast infection from amoxicillin. RN verbalized that those options are safe for pregnancy.

## 2023-01-06 DIAGNOSIS — B00.9 HERPES INFECTION IN PREGNANCY, FIRST TRIMESTER: ICD-10-CM

## 2023-01-06 DIAGNOSIS — O98.511 HERPES INFECTION IN PREGNANCY, FIRST TRIMESTER: ICD-10-CM

## 2023-01-06 DIAGNOSIS — B00.9 HSV-2 (HERPES SIMPLEX VIRUS 2) INFECTION: Primary | ICD-10-CM

## 2023-01-09 RX ORDER — VALACYCLOVIR HYDROCHLORIDE 500 MG/1
500 TABLET, FILM COATED ORAL 2 TIMES DAILY
Qty: 60 TABLET | Refills: 11 | Status: SHIPPED | OUTPATIENT
Start: 2023-01-09

## 2023-01-09 NOTE — TELEPHONE ENCOUNTER
Please call pt and see if she needs refill.  If she has been having outbreaks I recommend starting prophylaxis

## 2023-01-09 NOTE — TELEPHONE ENCOUNTER
RN called patient, patient verified PHIs, patient verified they are currently taking Valtrex as prescribed due to current outbreak. RN verbalized understanding and stated they would notify APRN or physician to adjust dose due to current condition of pregnancy.

## 2023-01-16 ENCOUNTER — ROUTINE PRENATAL (OUTPATIENT)
Dept: OBGYN CLINIC | Age: 33
End: 2023-01-16

## 2023-01-16 VITALS
SYSTOLIC BLOOD PRESSURE: 112 MMHG | HEIGHT: 62 IN | OXYGEN SATURATION: 98 % | DIASTOLIC BLOOD PRESSURE: 70 MMHG | HEART RATE: 123 BPM | WEIGHT: 203 LBS | BODY MASS INDEX: 37.36 KG/M2

## 2023-01-16 DIAGNOSIS — R00.2 HEART PALPITATIONS: ICD-10-CM

## 2023-01-16 DIAGNOSIS — R00.2 HEART PALPITATIONS: Primary | ICD-10-CM

## 2023-01-16 DIAGNOSIS — Z34.82 MULTIGRAVIDA IN SECOND TRIMESTER: ICD-10-CM

## 2023-01-16 DIAGNOSIS — Z34.81 MULTIGRAVIDA IN FIRST TRIMESTER: ICD-10-CM

## 2023-01-16 LAB
ERYTHROCYTE [DISTWIDTH] IN BLOOD BY AUTOMATED COUNT: 13.6 % (ref 11.9–14.6)
HCT VFR BLD AUTO: 41.5 % (ref 35.8–46.3)
HGB BLD-MCNC: 14.1 G/DL (ref 11.7–15.4)
MCH RBC QN AUTO: 31.6 PG (ref 26.1–32.9)
MCHC RBC AUTO-ENTMCNC: 34 G/DL (ref 31.4–35)
MCV RBC AUTO: 93 FL (ref 82–102)
NRBC # BLD: 0 K/UL (ref 0–0.2)
PLATELET # BLD AUTO: 282 K/UL (ref 150–450)
PMV BLD AUTO: 10.6 FL (ref 9.4–12.3)
RBC # BLD AUTO: 4.46 M/UL (ref 4.05–5.2)
WBC # BLD AUTO: 10.1 K/UL (ref 4.3–11.1)

## 2023-01-16 PROCEDURE — 99902 PR PRENATAL VISIT: CPT | Performed by: NURSE PRACTITIONER

## 2023-01-16 ASSESSMENT — PATIENT HEALTH QUESTIONNAIRE - PHQ9
1. LITTLE INTEREST OR PLEASURE IN DOING THINGS: 0
SUM OF ALL RESPONSES TO PHQ QUESTIONS 1-9: 0
SUM OF ALL RESPONSES TO PHQ QUESTIONS 1-9: 0
SUM OF ALL RESPONSES TO PHQ9 QUESTIONS 1 & 2: 0
SUM OF ALL RESPONSES TO PHQ QUESTIONS 1-9: 0
SUM OF ALL RESPONSES TO PHQ QUESTIONS 1-9: 0
2. FEELING DOWN, DEPRESSED OR HOPELESS: 0

## 2023-01-16 NOTE — ASSESSMENT & PLAN NOTE
PTL/labor precautions, [address], and pregnancy warning signs reviewed. Pt advised to call the office at [phone] or go straight to Labor and Delivery at [address] with any of the following concerns vaginal bleeding, leaking of fluid, damian regularly Q 5-7 minutes for over an hour or not feeling the baby move.    RTO as scheduled on 1/24/23

## 2023-01-16 NOTE — PROGRESS NOTES
I have reviewed the patient's visit today including history, exam and assessment by JEANETTE Luciano. I agree with treatment/plan as above.     Shane Cueva MD  10:08 AM  01/16/23

## 2023-01-16 NOTE — PROGRESS NOTES
This is a 28 y.o.  Biancamaxim Farmert at 14w2d for routine OB visit. Her Estimated Due Date is 7/15/2023, by Last Menstrual Period    Denies leaking of fluid, vaginal bleeding, or regular contractions. Pt c/o episodes of heart palpitations similar to last pregnancy. Was on metoprolol last pregnancy and seen by cardiology. States onset was 3-4 weeks ago. Feels palpitations 3x/day, but only causes symptoms approx 3x/week (dizziness and diaphoretic). Occurs at rest and with activity. No symptoms today. Current Outpatient Medications on File Prior to Visit   Medication Sig Dispense Refill    valACYclovir (VALTREX) 500 MG tablet Take 1 tablet by mouth 2 times daily 60 tablet 11    BONJESTA 20-20 MG TBCR TAKE ONE TABLET BY MOUTH IN THE EVENING. if symptoms persist, MAY take ONE TABLET IN THE MORNING AND IN THE EVENING.  FOLIC ACID PO Take by mouth      ondansetron (ZOFRAN-ODT) 4 MG disintegrating tablet Take 1 tablet by mouth 3 times daily as needed for Nausea or Vomiting 28 tablet 1    ondansetron (ZOFRAN) 4 MG tablet Take 1 tablet by mouth every 8 hours as needed for Nausea or Vomiting 30 tablet 1    Cholecalciferol (VITAMIN D3) 25 MCG TABS       famotidine (PEPCID) 10 MG tablet        No current facility-administered medications on file prior to visit. Allergies   Allergen Reactions    Cefaclor Hives       OB History    Para Term  AB Living   2 1 1 0 0 1   SAB IAB Ectopic Molar Multiple Live Births   0 0 0 0 0 1       # 1 - Date: 20, Sex: Male, Weight: 7 lb 9 oz (3.43 kg), GA: 39w5d, Delivery: Vaginal, Spontaneous, Apgar1: None, Apgar5: None, Living: Living, Birth Comments: None    # 2 - Date: None, Sex: None, Weight: None, GA: None, Delivery: None, Apgar1: None, Apgar5: None, Living: None, Birth Comments: None        Past Medical History:   Diagnosis Date    Gastroesophageal reflux disease without esophagitis 8/10/2022    Symptoms controlled by Pepcid.      GERD (gastroesophageal reflux disease)     History of pancreatitis 8/10/2022    She had severe idiopathic pancreatitis in 2020 while living in [place]. Lipase was > 10,000. She was hospitalized. She saw gastroenterologists. She was not taking any medications. She was not drinking alcohol. Imaging studies negative for gallbladder disease (US / CT / HIDA scan). MRCP was negative. Cause undetermined.  History of positive PPD     Completed 6 months INH age 25. Raised in [place] age 3-12.     HSV-2 infection     Idiopathic pancreatitis     Infertility, female 2017    due to PCOS    PCOS (polycystic ovarian syndrome)     Tachycardia     Pregnancy induced    Vitamin D deficiency        Past Surgical History:   Procedure Laterality Date    TONSILLECTOMY      WISDOM TOOTH EXTRACTION         Family History   Problem Relation Age of Onset   Ardyth Moritz Migraines Mother     Migraines Sister     Breast Cancer Neg Hx     Colon Cancer Neg Hx     Ovarian Cancer Neg Hx     Uterine Cancer Neg Hx        Social History     Socioeconomic History    Marital status:      Spouse name: Not on file    Number of children: 1    Years of education: Not on file    Highest education level: Not on file   Occupational History     Comment: RN   Tobacco Use    Smoking status: Never    Smokeless tobacco: Never   Substance and Sexual Activity    Alcohol use: Yes     Comment: Once per month 2 glasses of wine    Drug use: Never    Sexual activity: Yes     Partners: Male   Other Topics Concern    Not on file   Social History Narrative    Abuse: Feels safe at home, no history of physical abuse, no history of sexual abuse      Social Determinants of Health     Financial Resource Strain: Not on file   Food Insecurity: Not on file   Transportation Needs: Not on file   Physical Activity: Insufficiently Active    Days of Exercise per Week: 1 day    Minutes of Exercise per Session: 30 min   Stress: Not on file   Social Connections: Not on file Intimate Partner Violence: Not At Risk    Fear of Current or Ex-Partner: No    Emotionally Abused: No    Physically Abused: No    Sexually Abused: No   Housing Stability: Not on file           Objective    Vitals:    01/16/23 0856   BP: 112/70   Site: Left Upper Arm   Position: Sitting   Pulse: (!) 123   SpO2: 98%   Weight: 203 lb (92.1 kg)   Height: 5' 2\" (1.575 m)       General: well developed, well nourished, in no acute distress    Head: normocephalic and atraumatic    Resp: even and unlabored    Psych: Normal mood and affect        Assessment and Plan      Patient Active Problem List    Diagnosis Date Noted    Heart palpitations 01/16/2023     Priority: Medium     Overview Note:     1/16/2023: pt reports tachycardia episodes 3x/day. Approx 3x/week it will also cause her to be diaphoretic and lightheaded. Pt reports similar symptoms in last pregnancy. Was evaluated by cardiology last pregnancy and started on metoprolol. Will check TSH, CBC, CMP and and Magnesium. Stat EKG and referral to cards      Multigravida in first trimester 12/19/2022     Priority: Medium     Overview Note:     SIERRA 7/15/2023 by LMP c/w 10 wk US    1/3/2023: NIPT neg x3, male, CF/SMA/DMD/Fragile X  negative       Assessment & Plan Note:     PTL/labor precautions, [address], and pregnancy warning signs reviewed. Pt advised to call the office at [phone] or go straight to Labor and Delivery at [facility] with any of the following concerns vaginal bleeding, leaking of fluid, damian regularly Q 5-7 minutes for over an hour or not feeling the baby move. RTO as scheduled on 1/24/23      Herpes infection in pregnancy, first trimester 08/05/2022     Overview Note:     12/19/22: Rare outbreaks, pt has rx if needed.  Plan prophylaxis at 35 weeks or sooner with outbreak  1/9/2023: pt experiencing outbreak, will start Valtrex 500 mg BID         Problem List Items Addressed This Visit        Circulatory    Heart palpitations - Primary    Relevant Orders    TSH with Reflex    CBC    Comprehensive Metabolic Panel    Magnesium    [facility] Anthony Euceda MD, Cardiology, [place]    EKG 12 lead       Other    Multigravida in first trimester     PTL/labor precautions, [address], and pregnancy warning signs reviewed. Pt advised to call the office at [phone] or go straight to Labor and Delivery at [facility] with any of the following concerns vaginal bleeding, leaking of fluid, damian regularly Q 5-7 minutes for over an hour or not feeling the baby move. RTO as scheduled on 1/24/23        Other Visit Diagnoses     Multigravida in second trimester        Relevant Orders    [facility] Anthony Euceda MD, Cardiology, Arturo Pedro          Orders Placed This Encounter   Procedures    TSH with Reflex    CBC    Comprehensive Metabolic Panel    Magnesium    [facility] Agnes Euceda MD, CardiologyOlga EKG 12 lead       Outpatient Encounter Medications as of 1/16/2023   Medication Sig Dispense Refill    valACYclovir (VALTREX) 500 MG tablet Take 1 tablet by mouth 2 times daily 60 tablet 11    BONJESTA 20-20 MG TBCR TAKE ONE TABLET BY MOUTH IN THE EVENING. if symptoms persist, MAY take ONE TABLET IN THE MORNING AND IN THE EVENING.  FOLIC ACID PO Take by mouth      ondansetron (ZOFRAN-ODT) 4 MG disintegrating tablet Take 1 tablet by mouth 3 times daily as needed for Nausea or Vomiting 28 tablet 1    ondansetron (ZOFRAN) 4 MG tablet Take 1 tablet by mouth every 8 hours as needed for Nausea or Vomiting 30 tablet 1    Cholecalciferol (VITAMIN D3) 25 MCG TABS       famotidine (PEPCID) 10 MG tablet        No facility-administered encounter medications on file as of 1/16/2023.                Labor signs, pregnancy warning signs, and fetal movement counting reviewed (if applicable)        Nicole Aragon NP, APRN - CNP 01/16/23 9:34 AM

## 2023-01-16 NOTE — PROGRESS NOTES
Patient comes in today for routine prenatal visit. Patient states concerns of tachycardia, s/s of palpations, dizziness, lightheadedness, and SOB the last several weeks. Patients states that ADLs, resting, and movement increase their heart rate. Fetal Movement: No  Contractions: No  Vaginal Bleeding: No  Leaking Fluid: No  GI/: Yes    Patient states Marilou Ilia has been therapeutic.      Vitals:    01/16/23 0856   BP: 112/70   Site: Left Upper Arm   Position: Sitting   Pulse: (!) 123   SpO2: 98%   Weight: 203 lb (92.1 kg)   Height: 5' 2\" (1.575 m)

## 2023-01-17 ENCOUNTER — HOSPITAL ENCOUNTER (OUTPATIENT)
Dept: NON INVASIVE DIAGNOSTICS | Age: 33
Discharge: HOME | End: 2023-01-19

## 2023-01-17 DIAGNOSIS — R00.2 HEART PALPITATIONS: ICD-10-CM

## 2023-01-17 LAB
ALBUMIN SERPL-MCNC: 3.1 G/DL (ref 3.5–5)
ALBUMIN/GLOB SERPL: 0.9 (ref 0.4–1.6)
ALP SERPL-CCNC: 64 U/L (ref 50–136)
ALT SERPL-CCNC: 15 U/L (ref 12–65)
ANION GAP SERPL CALC-SCNC: 9 MMOL/L (ref 2–11)
AST SERPL-CCNC: 13 U/L (ref 15–37)
BILIRUB SERPL-MCNC: 0.5 MG/DL (ref 0.2–1.1)
BUN SERPL-MCNC: 6 MG/DL (ref 6–23)
CALCIUM SERPL-MCNC: 8.9 MG/DL (ref 8.3–10.4)
CHLORIDE SERPL-SCNC: 110 MMOL/L (ref 101–110)
CO2 SERPL-SCNC: 20 MMOL/L (ref 21–32)
CREAT SERPL-MCNC: 0.6 MG/DL (ref 0.6–1)
EKG ATRIAL RATE: 96 BPM
EKG DIAGNOSIS: NORMAL
EKG P AXIS: 44 DEGREES
EKG P-R INTERVAL: 158 MS
EKG Q-T INTERVAL: 338 MS
EKG QRS DURATION: 72 MS
EKG QTC CALCULATION (BAZETT): 427 MS
EKG R AXIS: 25 DEGREES
EKG T AXIS: 35 DEGREES
EKG VENTRICULAR RATE: 96 BPM
GLOBULIN SER CALC-MCNC: 3.3 G/DL (ref 2.8–4.5)
GLUCOSE SERPL-MCNC: 132 MG/DL (ref 65–100)
MAGNESIUM SERPL-MCNC: 2 MG/DL (ref 1.8–2.4)
POTASSIUM SERPL-SCNC: 3.8 MMOL/L (ref 3.5–5.1)
PROT SERPL-MCNC: 6.4 G/DL (ref 6.3–8.2)
SODIUM SERPL-SCNC: 139 MMOL/L (ref 133–143)
TSH W FREE THYROID IF ABNORMAL: 1.99 UIU/ML (ref 0.36–3.74)

## 2023-01-17 PROCEDURE — 93005 ELECTROCARDIOGRAM TRACING: CPT

## 2023-01-24 ENCOUNTER — ROUTINE PRENATAL (OUTPATIENT)
Dept: OBGYN CLINIC | Age: 33
End: 2023-01-24

## 2023-01-24 VITALS
SYSTOLIC BLOOD PRESSURE: 118 MMHG | HEIGHT: 62 IN | OXYGEN SATURATION: 98 % | WEIGHT: 203 LBS | BODY MASS INDEX: 37.36 KG/M2 | HEART RATE: 90 BPM | DIASTOLIC BLOOD PRESSURE: 74 MMHG

## 2023-01-24 DIAGNOSIS — Z34.82 MULTIGRAVIDA IN SECOND TRIMESTER: ICD-10-CM

## 2023-01-24 DIAGNOSIS — R00.2 HEART PALPITATIONS: ICD-10-CM

## 2023-01-24 DIAGNOSIS — O98.512 HERPES VIRUS INFECTION IN MOTHER DURING SECOND TRIMESTER OF PREGNANCY: ICD-10-CM

## 2023-01-24 DIAGNOSIS — Z87.42 HISTORY OF PCOS: ICD-10-CM

## 2023-01-24 DIAGNOSIS — B00.9 HERPES VIRUS INFECTION IN MOTHER DURING SECOND TRIMESTER OF PREGNANCY: ICD-10-CM

## 2023-01-24 PROCEDURE — 99902 PR PRENATAL VISIT: CPT | Performed by: OBSTETRICS & GYNECOLOGY

## 2023-01-24 ASSESSMENT — PATIENT HEALTH QUESTIONNAIRE - PHQ9
SUM OF ALL RESPONSES TO PHQ9 QUESTIONS 1 & 2: 0
1. LITTLE INTEREST OR PLEASURE IN DOING THINGS: 0
SUM OF ALL RESPONSES TO PHQ QUESTIONS 1-9: 0
2. FEELING DOWN, DEPRESSED OR HOPELESS: 0

## 2023-01-24 NOTE — PROGRESS NOTES
Patient comes in today for routine prenatal visit. No complaints/concerns today. Patient states that since switching to night shift their pulse has been more controlled. Fetal Movement: No  Contractions: No  Vaginal Bleeding: No  Leaking Fluid: No  GI/: Yes    Patient states Zofran has been helpful.      Vitals:    01/24/23 0934   BP: 118/74   Site: Left Upper Arm   Position: Sitting   Pulse: 90   SpO2: 98%   Weight: 203 lb (92.1 kg)   Height: 5' 2\" (1.575 m)

## 2023-01-24 NOTE — PROGRESS NOTES
Chief Complaint   Patient presents with    Routine Prenatal Visit        This 28 y.o. Rupesh Isaac at 15w3d with Estimated Date of Delivery: 7/15/23 presents for routine prenatal visit. Patient has no complaints today. Pt reports no LOF, VB, ctx. Pt denies H/A, vision changes, abdom pain, N/V. Vitals:    01/24/23 0934   BP: 118/74   Site: Left Upper Arm   Position: Sitting   Pulse: 90   SpO2: 98%   Weight: 203 lb (92.1 kg)   Height: 5' 2\" (1.575 m)        Patient Active Problem List    Diagnosis Date Noted    History of PCOS 01/24/2023     Priority: Medium     Overview Note:     PLAN:  Hgb A1C (nml, 5.1) early and late glucola       Assessment & Plan Note:     noted      Heart palpitations 01/16/2023     Priority: Medium     Overview Note:     1/16/2023: pt reports tachycardia episodes 3x/day. Approx 3x/week it will also cause her to be diaphoretic and lightheaded. Pt reports similar symptoms in last pregnancy. Was evaluated by cardiology last pregnancy and started on metoprolol. Will check TSH, CBC, CMP and and Magnesium. Stat EKG and referral to cards       Assessment & Plan Note:     Improved with work change      Herpes virus infection in mother during second trimester of pregnancy 08/05/2022     Priority: Medium     Overview Note:     12/19/22: Rare outbreaks, pt has rx if needed. Plan prophylaxis at 35 weeks or sooner with outbreak  1/9/2023: pt experiencing outbreak, will start Valtrex 500 mg BID       Assessment & Plan Note:     Will cont propho throughout preg      Multigravida in second trimester 12/19/2022     Overview Note:     EDC by LMP confirmed by 10 6/7 wk US    1/3/2023: NIPT neg x3, male, CF/SMA/DMD/Fragile X  negative       Assessment & Plan Note:     Instructed pt to contact the office or seek immediate care if develops fever > 101.0, severe lower abdominal pain or heavy vaginal bleeding (soaking 2 or more pads per hour).            Problem List Items Addressed This Visit        Circulatory Heart palpitations     Improved with work change            Other    Herpes virus infection in mother during second trimester of pregnancy     Will cont propho throughout preg         History of PCOS     noted         Multigravida in second trimester     Instructed pt to contact the office or seek immediate care if develops fever > 101.0, severe lower abdominal pain or heavy vaginal bleeding (soaking 2 or more pads per hour).                 Yisel Lockhart MD     9:53 AM    01/24/23

## 2023-01-27 LAB
AFP INTERP SERPL-IMP: NORMAL
AFP MOM SERPL: 1.02
AFP SERPL-MCNC: 27.4 NG/ML
AGE AT DELIVERY: 33.2 YR
COMMENT: NORMAL
GA METHOD: NORMAL
GA: 15.4 WEEKS
IDDM PATIENT QL: NO
Lab: NORMAL
MAT SCN FOR FETAL ABNORMALITIES SERPL: NORMAL
MULTIPLE PREGNANCY: NO
NEURAL TUBE DEFECT RISK FETUS: NORMAL

## 2023-02-21 NOTE — PROGRESS NOTES
[facility] CARDIOLOGY History & Physical                 Reason for Visit: Palpitations    Subjective:     Patient is a 28 y.o. female with a PMH of current pregnancy (20 weeks) who presents as a referral for palpitations. The patient reports that she is pregnant at 20 weeks with her second son. She reports that for the last 8 weeks, about once a day, she feels a \"fluttering\" in the chest.  She also reports a tachycardia sensation. She reports shortness of breath at rest as well as ROGERS. The patient also reports near syncope. The last episode of palpitations and near syncope occurred in the last 3 days. She denies chest pain. She had an ECG on January 17 that was noted to demonstrate normal sinus rhythm with a heart rate of 96 bpm.    Past Medical History:   Diagnosis Date    Gastroesophageal reflux disease without esophagitis 8/10/2022    Symptoms controlled by Pepcid. GERD (gastroesophageal reflux disease)     History of pancreatitis 8/10/2022    She had severe idiopathic pancreatitis in 2020 while living in [place]. Lipase was > 10,000. She was hospitalized. She saw gastroenterologists. She was not taking any medications. She was not drinking alcohol. Imaging studies negative for gallbladder disease (US / CT / HIDA scan). MRCP was negative. Cause undetermined. History of positive PPD     Completed 6 months INH age 25. Raised in [place] age 3-12.     HSV-2 infection     Idiopathic pancreatitis     Infertility, female 2017    due to PCOS    PCOS (polycystic ovarian syndrome)     Tachycardia     Pregnancy induced    Vitamin D deficiency       Past Surgical History:   Procedure Laterality Date    TONSILLECTOMY      WISDOM TOOTH EXTRACTION        Family History   Problem Relation Age of Onset    Migraines Mother     Migraines Sister     Breast Cancer Neg Hx     Colon Cancer Neg Hx     Ovarian Cancer Neg Hx     Uterine Cancer Neg Hx       Social History     Tobacco Use    Smoking status: Never Smokeless tobacco: Never   Substance Use Topics    Alcohol use: Yes     Comment: Once per month 2 glasses of wine      Allergies   Allergen Reactions    Cefaclor Hives         ROS:  No obvious pertinent positives on review of systems except for what was outlined above.        Objective:       BP 98/68   Pulse (!) 106   Ht 5' 2\" (1.575 m)   Wt 211 lb 3.2 oz (95.8 kg)   LMP 10/08/2022 (Exact Date)   BMI 38.63 kg/m²     BP Readings from Last 3 Encounters:   02/22/23 98/68   01/24/23 118/74   01/16/23 112/70       Wt Readings from Last 3 Encounters:   02/22/23 211 lb 3.2 oz (95.8 kg)   01/24/23 203 lb (92.1 kg)   01/16/23 203 lb (92.1 kg)       General/Constitutional:   Alert and oriented x 3, no acute distress  HEENT:   normocephalic, atraumatic, moist mucous membranes  Neck:   No JVD or carotid bruits bilaterally  Cardiovascular:   Tachycardic, regular, no rub/gallop appreciated  Pulmonary:   clear to auscultation bilaterally, no respiratory distress  Abdomen:   soft, non-tender, non-distended  Ext:   No sig LE edema bilaterally  Skin:  warm and dry, no obvious rashes seen  Neuro:   no obvious sensory or motor deficits  Psychiatric:   normal mood and affect    Data Review:   Lab Results   Component Value Date    CHOL 184 08/19/2022     Lab Results   Component Value Date    TRIG 147 08/19/2022     Lab Results   Component Value Date    HDL 57 08/19/2022     Lab Results   Component Value Date    LDLCALC 97.6 08/19/2022     Lab Results   Component Value Date    LABVLDL 29.4 (H) 08/19/2022     Lab Results   Component Value Date    CHOLHDLRATIO 3.2 08/19/2022        Lab Results   Component Value Date/Time     01/16/2023 09:43 AM     08/19/2022 09:09 AM    K 3.8 01/16/2023 09:43 AM    K 4.2 08/19/2022 09:09 AM     01/16/2023 09:43 AM     08/19/2022 09:09 AM    CO2 20 01/16/2023 09:43 AM    CO2 24 08/19/2022 09:09 AM    BUN 6 01/16/2023 09:43 AM    BUN 10 08/19/2022 09:09 AM    CREATININE 0.60 01/16/2023 09:43 AM    CREATININE 0.80 08/19/2022 09:09 AM    GLUCOSE 132 01/16/2023 09:43 AM    GLUCOSE 86 08/19/2022 09:09 AM    CALCIUM 8.9 01/16/2023 09:43 AM    CALCIUM 9.0 08/19/2022 09:09 AM         Lab Results   Component Value Date    ALT 15 01/16/2023    ALT 25 08/19/2022    AST 13 (L) 01/16/2023    AST 18 08/19/2022        Assessment/Plan:   1. Palpitations  - Obtain a ZIO for 3 days     2.  ROGERS (dyspnea on exertion)  - Obtain an echocardiogram   - PE unlikely per PE Wells score     F/U: As needed    Arthur Carballo MD

## 2023-02-22 ENCOUNTER — INITIAL CONSULT (OUTPATIENT)
Dept: CARDIOLOGY CLINIC | Age: 33
End: 2023-02-22
Payer: COMMERCIAL

## 2023-02-22 VITALS
SYSTOLIC BLOOD PRESSURE: 98 MMHG | HEART RATE: 106 BPM | BODY MASS INDEX: 38.87 KG/M2 | HEIGHT: 62 IN | DIASTOLIC BLOOD PRESSURE: 68 MMHG | WEIGHT: 211.2 LBS

## 2023-02-22 DIAGNOSIS — R06.09 DOE (DYSPNEA ON EXERTION): ICD-10-CM

## 2023-02-22 DIAGNOSIS — R00.2 PALPITATIONS: Primary | ICD-10-CM

## 2023-02-22 PROCEDURE — 99214 OFFICE O/P EST MOD 30 MIN: CPT | Performed by: INTERNAL MEDICINE

## 2023-03-06 ENCOUNTER — TELEPHONE (OUTPATIENT)
Dept: CARDIOLOGY CLINIC | Age: 33
End: 2023-03-06

## 2023-03-06 NOTE — TELEPHONE ENCOUNTER
----- Message from Mariola Fields MD sent at 3/6/2023  1:25 AM EST -----  Please let the patient know that the heart function is normal on ECHO. The patient does have mild mitral regurgitation documented on the echocardiogram.  Therefore, this warrants a surveillance echocardiogram in 3 to 5 years. Mild mitral regurgitation is a common finding on echocardiography and does not account for the patient's symptoms. I recommend the future echocardiogram be ordered by the patient's PCP's office at that time, and the patient can follow-up with us if needed if there is progression. A message has been sent to the patient's PCP with the recommendation.

## 2023-03-09 NOTE — TELEPHONE ENCOUNTER
Advised patient of echo results and Dr. Maurizio Enriquez response. Patient verbalized understanding.

## 2023-03-10 ENCOUNTER — TELEPHONE (OUTPATIENT)
Dept: CARDIOLOGY CLINIC | Age: 33
End: 2023-03-10

## 2023-03-10 NOTE — TELEPHONE ENCOUNTER
----- Message from Mao Cazares MD sent at 3/10/2023  3:08 PM EST -----  Please let the patient know that the patient was predominantly in sinus rhythm.  The patient had rare ectopy.  No new changes to medical therapy at this time.

## 2023-08-11 ASSESSMENT — PATIENT HEALTH QUESTIONNAIRE - PHQ9
SUM OF ALL RESPONSES TO PHQ QUESTIONS 1-9: 0
SUM OF ALL RESPONSES TO PHQ9 QUESTIONS 1 & 2: 0
2. FEELING DOWN, DEPRESSED OR HOPELESS: 0
1. LITTLE INTEREST OR PLEASURE IN DOING THINGS: NOT AT ALL
2. FEELING DOWN, DEPRESSED OR HOPELESS: NOT AT ALL
1. LITTLE INTEREST OR PLEASURE IN DOING THINGS: 0
SUM OF ALL RESPONSES TO PHQ QUESTIONS 1-9: 0
SUM OF ALL RESPONSES TO PHQ9 QUESTIONS 1 & 2: 0

## 2023-08-14 ENCOUNTER — OFFICE VISIT (OUTPATIENT)
Dept: INTERNAL MEDICINE CLINIC | Facility: CLINIC | Age: 33
End: 2023-08-14
Payer: COMMERCIAL

## 2023-08-14 VITALS
HEIGHT: 62 IN | WEIGHT: 223.8 LBS | DIASTOLIC BLOOD PRESSURE: 84 MMHG | BODY MASS INDEX: 41.18 KG/M2 | SYSTOLIC BLOOD PRESSURE: 118 MMHG | TEMPERATURE: 96.7 F | HEART RATE: 92 BPM | RESPIRATION RATE: 20 BRPM | OXYGEN SATURATION: 97 %

## 2023-08-14 DIAGNOSIS — Z87.19 HISTORY OF PANCREATITIS: ICD-10-CM

## 2023-08-14 DIAGNOSIS — Z00.00 ENCOUNTER FOR PREVENTIVE HEALTH EXAMINATION: Primary | Chronic | ICD-10-CM

## 2023-08-14 DIAGNOSIS — R00.2 PALPITATIONS: ICD-10-CM

## 2023-08-14 DIAGNOSIS — K21.9 GASTROESOPHAGEAL REFLUX DISEASE WITHOUT ESOPHAGITIS: ICD-10-CM

## 2023-08-14 DIAGNOSIS — E55.9 VITAMIN D DEFICIENCY: ICD-10-CM

## 2023-08-14 PROBLEM — R06.09 DOE (DYSPNEA ON EXERTION): Status: RESOLVED | Noted: 2023-02-22 | Resolved: 2023-08-14

## 2023-08-14 PROCEDURE — 99395 PREV VISIT EST AGE 18-39: CPT | Performed by: INTERNAL MEDICINE

## 2023-08-14 SDOH — ECONOMIC STABILITY: FOOD INSECURITY: WITHIN THE PAST 12 MONTHS, YOU WORRIED THAT YOUR FOOD WOULD RUN OUT BEFORE YOU GOT MONEY TO BUY MORE.: NEVER TRUE

## 2023-08-14 SDOH — ECONOMIC STABILITY: INCOME INSECURITY: HOW HARD IS IT FOR YOU TO PAY FOR THE VERY BASICS LIKE FOOD, HOUSING, MEDICAL CARE, AND HEATING?: NOT VERY HARD

## 2023-08-14 SDOH — ECONOMIC STABILITY: HOUSING INSECURITY
IN THE LAST 12 MONTHS, WAS THERE A TIME WHEN YOU DID NOT HAVE A STEADY PLACE TO SLEEP OR SLEPT IN A SHELTER (INCLUDING NOW)?: NO

## 2023-08-14 SDOH — ECONOMIC STABILITY: FOOD INSECURITY: WITHIN THE PAST 12 MONTHS, THE FOOD YOU BOUGHT JUST DIDN'T LAST AND YOU DIDN'T HAVE MONEY TO GET MORE.: NEVER TRUE

## 2023-08-14 ASSESSMENT — ENCOUNTER SYMPTOMS
EYE PAIN: 0
VOICE CHANGE: 0
RECTAL PAIN: 0
STRIDOR: 0

## 2023-08-14 NOTE — PROGRESS NOTES
FOLLOWUP VISIT    Subjective:    Ms. Leilani Suggs is a 35 y.o., female,   Chief Complaint   Patient presents with    Annual Exam       HPI:    Patient is a very pleasant 24-year-old female who presents today for her annual preventive health visit. Her acid reflux is controlled with dietary and lifestyle modifications as well as over-the-counter Pepcid or Pepcid Complete on an as-needed basis. She just delivered a baby and has a healthy 10week-old infant son with her today. She had palpitations with her previous pregnancies. Evaluation during her recent pregnancy included a normal CBC, CMP, magnesium, and TSH. A prolonged Holter monitor showed sinus rhythm with occasional ectopy. An echocardiogram was normal.  Her palpitations resolved after delivery. Denies any current shortness of breath or dyspnea on exertion. Interval history  and review of symptoms otherwise unchanged. The following portions of the patient's history were reviewed and updated as appropriate:      Past Medical History:   Diagnosis Date    Gastroesophageal reflux disease without esophagitis 8/10/2022    Symptoms controlled by Pepcid. GERD (gastroesophageal reflux disease)     History of pancreatitis 8/10/2022    She had severe idiopathic pancreatitis in 2020 while living in [place]. Lipase was > 10,000. She was hospitalized. She saw gastroenterologists. She was not taking any medications. She was not drinking alcohol. Imaging studies negative for gallbladder disease (US / CT / HIDA scan). MRCP was negative. Cause undetermined. History of positive PPD     Completed 6 months INH age 25. Raised in [facility] age 3-12.     HSV-2 infection     Idiopathic pancreatitis     Infertility, female 2017    due to PCOS    PCOS (polycystic ovarian syndrome)     Tachycardia     Pregnancy induced    Vitamin D deficiency        Past Surgical History:   Procedure Laterality Date    TONSILLECTOMY      WISDOM TOOTH EXTRACTION

## 2024-01-18 ENCOUNTER — OFFICE VISIT (OUTPATIENT)
Dept: INTERNAL MEDICINE CLINIC | Facility: CLINIC | Age: 34
End: 2024-01-18
Payer: COMMERCIAL

## 2024-01-18 VITALS
DIASTOLIC BLOOD PRESSURE: 80 MMHG | SYSTOLIC BLOOD PRESSURE: 130 MMHG | HEIGHT: 62 IN | WEIGHT: 218 LBS | HEART RATE: 84 BPM | BODY MASS INDEX: 40.12 KG/M2

## 2024-01-18 DIAGNOSIS — R05.8 POST-VIRAL COUGH SYNDROME: Primary | ICD-10-CM

## 2024-01-18 PROCEDURE — 99213 OFFICE O/P EST LOW 20 MIN: CPT | Performed by: INTERNAL MEDICINE

## 2024-01-18 RX ORDER — BUDESONIDE AND FORMOTEROL FUMARATE DIHYDRATE 160; 4.5 UG/1; UG/1
2 AEROSOL RESPIRATORY (INHALATION) 2 TIMES DAILY
Qty: 10.2 G | Refills: 0 | Status: SHIPPED | OUTPATIENT
Start: 2024-01-18

## 2024-01-18 ASSESSMENT — PATIENT HEALTH QUESTIONNAIRE - PHQ9
SUM OF ALL RESPONSES TO PHQ9 QUESTIONS 1 & 2: 0
SUM OF ALL RESPONSES TO PHQ QUESTIONS 1-9: 0
SUM OF ALL RESPONSES TO PHQ QUESTIONS 1-9: 0
1. LITTLE INTEREST OR PLEASURE IN DOING THINGS: 0
2. FEELING DOWN, DEPRESSED OR HOPELESS: 0
SUM OF ALL RESPONSES TO PHQ QUESTIONS 1-9: 0
SUM OF ALL RESPONSES TO PHQ QUESTIONS 1-9: 0

## 2024-01-18 ASSESSMENT — ENCOUNTER SYMPTOMS
RECTAL PAIN: 0
EYE PAIN: 0
VOICE CHANGE: 0
STRIDOR: 0

## 2024-01-18 NOTE — PROGRESS NOTES
Reaction Noted    Cefaclor Hives 08/03/2022       Review of Systems   Constitutional:  Negative for activity change and appetite change.   HENT:  Negative for drooling and voice change.    Eyes:  Negative for pain.   Respiratory:  Negative for stridor.    Gastrointestinal:  Negative for rectal pain.   Endocrine: Negative for polydipsia and polyphagia.   Genitourinary:  Negative for dyspareunia and enuresis.   Musculoskeletal:  Negative for gait problem and neck stiffness.   Skin:  Negative for pallor.   Neurological:  Negative for facial asymmetry and speech difficulty.   Hematological:  Does not bruise/bleed easily.   Psychiatric/Behavioral:  Negative for self-injury. The patient is not hyperactive.             Patient Care Team:  Jose Juan Nichols MD as PCP - General (Internal Medicine)  Jose Juan Nichols MD as PCP - Empaneled Provider  Not, On File (Inactive)    Objective:    /80 (Site: Left Upper Arm, Position: Sitting)   Pulse 84   Ht 1.575 m (5' 2\")   Wt 98.9 kg (218 lb)   BMI 39.87 kg/m²     Physical Exam  Vitals reviewed.   Constitutional:       General: She is not in acute distress.     Appearance: She is not toxic-appearing.   HENT:      Head: Normocephalic and atraumatic.      Right Ear: Tympanic membrane, ear canal and external ear normal.      Left Ear: Tympanic membrane, ear canal and external ear normal.      Nose: Nose normal.      Mouth/Throat:      Mouth: Mucous membranes are moist.      Pharynx: Oropharynx is clear.   Eyes:      General: No scleral icterus.     Extraocular Movements: Extraocular movements intact.      Pupils: Pupils are equal, round, and reactive to light.   Cardiovascular:      Rate and Rhythm: Normal rate and regular rhythm.      Pulses: Normal pulses.      Heart sounds: Normal heart sounds.   Pulmonary:      Effort: Pulmonary effort is normal. No respiratory distress.      Breath sounds: Normal breath sounds. No stridor.   Abdominal:      General: Abdomen is flat. Bowel sounds

## 2024-06-11 ENCOUNTER — OFFICE VISIT (OUTPATIENT)
Dept: INTERNAL MEDICINE CLINIC | Facility: CLINIC | Age: 34
End: 2024-06-11
Payer: COMMERCIAL

## 2024-06-11 VITALS
HEART RATE: 75 BPM | SYSTOLIC BLOOD PRESSURE: 128 MMHG | DIASTOLIC BLOOD PRESSURE: 80 MMHG | WEIGHT: 238 LBS | BODY MASS INDEX: 43.79 KG/M2 | HEIGHT: 62 IN

## 2024-06-11 DIAGNOSIS — E66.01 MORBID OBESITY (HCC): Primary | ICD-10-CM

## 2024-06-11 PROCEDURE — 99213 OFFICE O/P EST LOW 20 MIN: CPT | Performed by: INTERNAL MEDICINE

## 2024-06-11 ASSESSMENT — ENCOUNTER SYMPTOMS
VOICE CHANGE: 0
STRIDOR: 0
EYE PAIN: 0
RECTAL PAIN: 0

## 2024-06-11 NOTE — PROGRESS NOTES
Genitourinary:  Negative for dyspareunia and enuresis.   Musculoskeletal:  Negative for gait problem and neck stiffness.   Skin:  Negative for pallor.   Neurological:  Negative for facial asymmetry and speech difficulty.   Hematological:  Does not bruise/bleed easily.   Psychiatric/Behavioral:  Negative for self-injury. The patient is not hyperactive.             Patient Care Team:  Jose Juan Nichols MD as PCP - General (Internal Medicine)  Jose Juan Nichols MD as PCP - Empaneled Provider  Not, On File (Inactive)    Objective:    /80 (Site: Left Upper Arm, Position: Sitting)   Pulse 75   Ht 1.575 m (5' 2\")   Wt 108 kg (238 lb)   Breastfeeding No   BMI 43.53 kg/m²     Physical Exam  Constitutional:       General: She is not in acute distress.     Appearance: She is not toxic-appearing.   HENT:      Head: Normocephalic and atraumatic.      Nose: Nose normal.   Eyes:      Extraocular Movements: Extraocular movements intact.      Conjunctiva/sclera: Conjunctivae normal.   Pulmonary:      Effort: Pulmonary effort is normal. No respiratory distress.      Breath sounds: No stridor.   Skin:     Coloration: Skin is not jaundiced or pale.   Neurological:      Mental Status: She is alert and oriented to person, place, and time.   Psychiatric:         Thought Content: Thought content normal.              Ancillary Procedure on 03/03/2023   Component Date Value Ref Range Status    LV EDV A2C 03/03/2023 77  mL Final    LV EDV A4C 03/03/2023 99  mL Final    LV ESV A2C 03/03/2023 28  mL Final    LV ESV A4C 03/03/2023 43  mL Final    IVSd 03/03/2023 0.8  0.6 - 0.9 cm Final    LVIDd 03/03/2023 4.6  3.9 - 5.3 cm Final    LVIDs 03/03/2023 3.1  cm Final    LVPWd 03/03/2023 0.8  0.6 - 0.9 cm Final    LV E' Lateral Velocity 03/03/2023 11  cm/s Final    LV E' Septal Velocity 03/03/2023 8  cm/s Final    LV Ejection Fraction A2C 03/03/2023 64  % Final    LV Ejection Fraction A4C 03/03/2023 57  % Final    EF BP 03/03/2023 59  55 - 100 %

## 2024-08-14 ENCOUNTER — OFFICE VISIT (OUTPATIENT)
Dept: INTERNAL MEDICINE CLINIC | Facility: CLINIC | Age: 34
End: 2024-08-14
Payer: COMMERCIAL

## 2024-08-14 VITALS
SYSTOLIC BLOOD PRESSURE: 106 MMHG | WEIGHT: 211.8 LBS | OXYGEN SATURATION: 98 % | DIASTOLIC BLOOD PRESSURE: 78 MMHG | HEIGHT: 62 IN | HEART RATE: 82 BPM | BODY MASS INDEX: 38.98 KG/M2

## 2024-08-14 DIAGNOSIS — B00.9 HSV-2 INFECTION: ICD-10-CM

## 2024-08-14 DIAGNOSIS — Z00.00 ENCOUNTER FOR PREVENTIVE HEALTH EXAMINATION: Primary | Chronic | ICD-10-CM

## 2024-08-14 DIAGNOSIS — M54.9 CHRONIC UPPER BACK PAIN: ICD-10-CM

## 2024-08-14 DIAGNOSIS — F32.A DEPRESSION, UNSPECIFIED DEPRESSION TYPE: ICD-10-CM

## 2024-08-14 DIAGNOSIS — E66.9 OBESITY (BMI 30-39.9): ICD-10-CM

## 2024-08-14 DIAGNOSIS — G89.29 CHRONIC UPPER BACK PAIN: ICD-10-CM

## 2024-08-14 DIAGNOSIS — K21.9 GASTROESOPHAGEAL REFLUX DISEASE WITHOUT ESOPHAGITIS: ICD-10-CM

## 2024-08-14 DIAGNOSIS — N62 LARGE BREASTS: ICD-10-CM

## 2024-08-14 PROCEDURE — 99395 PREV VISIT EST AGE 18-39: CPT | Performed by: INTERNAL MEDICINE

## 2024-08-14 RX ORDER — BUPROPION HYDROCHLORIDE 150 MG/1
150 TABLET ORAL EVERY MORNING
Qty: 30 TABLET | Refills: 1 | Status: SHIPPED | OUTPATIENT
Start: 2024-08-14

## 2024-08-14 SDOH — ECONOMIC STABILITY: FOOD INSECURITY: WITHIN THE PAST 12 MONTHS, THE FOOD YOU BOUGHT JUST DIDN'T LAST AND YOU DIDN'T HAVE MONEY TO GET MORE.: NEVER TRUE

## 2024-08-14 SDOH — ECONOMIC STABILITY: FOOD INSECURITY: WITHIN THE PAST 12 MONTHS, YOU WORRIED THAT YOUR FOOD WOULD RUN OUT BEFORE YOU GOT MONEY TO BUY MORE.: NEVER TRUE

## 2024-08-14 SDOH — ECONOMIC STABILITY: INCOME INSECURITY: HOW HARD IS IT FOR YOU TO PAY FOR THE VERY BASICS LIKE FOOD, HOUSING, MEDICAL CARE, AND HEATING?: NOT HARD AT ALL

## 2024-08-14 ASSESSMENT — ENCOUNTER SYMPTOMS
RECTAL PAIN: 0
STRIDOR: 0
VOICE CHANGE: 0
EYE PAIN: 0

## 2024-08-14 NOTE — PROGRESS NOTES
FOLLOWUP VISIT    Subjective:    Ms. Taylor is a 34 y.o., female,   Chief Complaint   Patient presents with    Annual Exam       HPI:    This patient is a very pleasant 34-year-old female who presents today for her annual wellness exam.  She also has 2 acute concerns.    She has always had large breasts.  They seem to have gotten larger after weight gain in pregnancy.  She can feel them weighing her down and this is affecting her posture.  She reports a lot of neck pain and upper back pain at the end of the day due to her large breasts and this is becoming increasingly uncomfortable.  She would like to explore breast reduction surgery.    She also reports some sadness and anhedonia.  She initially thought it was a postpartum phenomenon but its persisted for months.  Denies any suicidality.  No manic or hypomanic or psychotic symptoms.  She would like to discuss potential antidepressant therapy.    Her acid reflux is controlled primarily via lifestyle modifications.  She rarely needs to use famotidine.    She has had a history of HSV-2 and was previously on chronic daily suppressive Valtrex prescribed by her GYN.  She has not taken this medication in over a year and has not had any outbreaks.  She would like to keep on her medication list just in case it flares up and she needs refills.    With respect to her weight she is now following a Mediterranean diet and trying to exercise.  She is slowly losing weight.  She has lost over 20 pounds in the last several months.    The following portions of the patient's history were reviewed and updated as appropriate:      Past Medical History:   Diagnosis Date    Gastroesophageal reflux disease without esophagitis 8/10/2022    Symptoms controlled by Pepcid.     GERD (gastroesophageal reflux disease)     History of pancreatitis 8/10/2022    She had severe idiopathic pancreatitis in 2020 while living in [place].  Lipase was > 10,000.  She was hospitalized.  She saw

## 2024-09-03 DIAGNOSIS — Z00.00 ENCOUNTER FOR PREVENTIVE HEALTH EXAMINATION: Primary | Chronic | ICD-10-CM

## 2024-09-03 DIAGNOSIS — E55.9 VITAMIN D DEFICIENCY: ICD-10-CM

## 2024-09-03 DIAGNOSIS — E66.9 OBESITY (BMI 30-39.9): ICD-10-CM

## 2024-09-23 ENCOUNTER — PATIENT MESSAGE (OUTPATIENT)
Dept: INTERNAL MEDICINE CLINIC | Facility: CLINIC | Age: 34
End: 2024-09-23

## 2024-09-25 ENCOUNTER — TELEMEDICINE (OUTPATIENT)
Dept: INTERNAL MEDICINE CLINIC | Facility: CLINIC | Age: 34
End: 2024-09-25
Payer: COMMERCIAL

## 2024-09-25 DIAGNOSIS — F32.A DEPRESSION, UNSPECIFIED DEPRESSION TYPE: ICD-10-CM

## 2024-09-25 PROCEDURE — 99213 OFFICE O/P EST LOW 20 MIN: CPT | Performed by: INTERNAL MEDICINE

## 2024-09-25 RX ORDER — BUPROPION HYDROCHLORIDE 150 MG/1
150 TABLET ORAL EVERY MORNING
Qty: 30 TABLET | Refills: 5 | Status: SHIPPED | OUTPATIENT
Start: 2024-09-25

## 2024-09-25 ASSESSMENT — ENCOUNTER SYMPTOMS
EYE PAIN: 0
RECTAL PAIN: 0
VOICE CHANGE: 0
STRIDOR: 0

## 2024-10-01 DIAGNOSIS — E66.9 OBESITY (BMI 30-39.9): ICD-10-CM

## 2024-10-01 DIAGNOSIS — E55.9 VITAMIN D DEFICIENCY: ICD-10-CM

## 2024-10-01 DIAGNOSIS — Z00.00 ENCOUNTER FOR PREVENTIVE HEALTH EXAMINATION: Chronic | ICD-10-CM

## 2024-10-01 LAB
25(OH)D3 SERPL-MCNC: 29.5 NG/ML (ref 30–100)
ALBUMIN SERPL-MCNC: 4.1 G/DL (ref 3.5–5)
ALBUMIN/GLOB SERPL: 1.3 (ref 1–1.9)
ALP SERPL-CCNC: 104 U/L (ref 35–104)
ALT SERPL-CCNC: 17 U/L (ref 8–45)
ANION GAP SERPL CALC-SCNC: 10 MMOL/L (ref 9–18)
AST SERPL-CCNC: 19 U/L (ref 15–37)
BASOPHILS # BLD: 0.1 K/UL (ref 0–0.2)
BASOPHILS NFR BLD: 1 % (ref 0–2)
BILIRUB SERPL-MCNC: 0.5 MG/DL (ref 0–1.2)
BUN SERPL-MCNC: 10 MG/DL (ref 6–23)
CALCIUM SERPL-MCNC: 9.6 MG/DL (ref 8.8–10.2)
CHLORIDE SERPL-SCNC: 103 MMOL/L (ref 98–107)
CHOLEST SERPL-MCNC: 141 MG/DL (ref 0–200)
CO2 SERPL-SCNC: 26 MMOL/L (ref 20–28)
CREAT SERPL-MCNC: 0.85 MG/DL (ref 0.6–1.1)
DIFFERENTIAL METHOD BLD: ABNORMAL
EOSINOPHIL # BLD: 0.2 K/UL (ref 0–0.8)
EOSINOPHIL NFR BLD: 3 % (ref 0.5–7.8)
ERYTHROCYTE [DISTWIDTH] IN BLOOD BY AUTOMATED COUNT: 13.1 % (ref 11.9–14.6)
EST. AVERAGE GLUCOSE BLD GHB EST-MCNC: 108 MG/DL
GLOBULIN SER CALC-MCNC: 3.2 G/DL (ref 2.3–3.5)
GLUCOSE SERPL-MCNC: 86 MG/DL (ref 70–99)
HBA1C MFR BLD: 5.4 % (ref 0–5.6)
HCT VFR BLD AUTO: 46.5 % (ref 35.8–46.3)
HDLC SERPL-MCNC: 40 MG/DL (ref 40–60)
HDLC SERPL: 3.5 (ref 0–5)
HGB BLD-MCNC: 14.9 G/DL (ref 11.7–15.4)
IMM GRANULOCYTES # BLD AUTO: 0 K/UL (ref 0–0.5)
IMM GRANULOCYTES NFR BLD AUTO: 0 % (ref 0–5)
LDLC SERPL CALC-MCNC: 74 MG/DL (ref 0–100)
LYMPHOCYTES # BLD: 2.3 K/UL (ref 0.5–4.6)
LYMPHOCYTES NFR BLD: 32 % (ref 13–44)
MCH RBC QN AUTO: 30.2 PG (ref 26.1–32.9)
MCHC RBC AUTO-ENTMCNC: 32 G/DL (ref 31.4–35)
MCV RBC AUTO: 94.3 FL (ref 82–102)
MONOCYTES # BLD: 0.5 K/UL (ref 0.1–1.3)
MONOCYTES NFR BLD: 7 % (ref 4–12)
NEUTS SEG # BLD: 4.3 K/UL (ref 1.7–8.2)
NEUTS SEG NFR BLD: 57 % (ref 43–78)
NRBC # BLD: 0 K/UL (ref 0–0.2)
PLATELET # BLD AUTO: 310 K/UL (ref 150–450)
PMV BLD AUTO: 10.4 FL (ref 9.4–12.3)
POTASSIUM SERPL-SCNC: 4.4 MMOL/L (ref 3.5–5.1)
PROT SERPL-MCNC: 7.3 G/DL (ref 6.3–8.2)
RBC # BLD AUTO: 4.93 M/UL (ref 4.05–5.2)
SODIUM SERPL-SCNC: 140 MMOL/L (ref 136–145)
TRIGL SERPL-MCNC: 133 MG/DL (ref 0–150)
TSH W FREE THYROID IF ABNORMAL: 2.06 UIU/ML (ref 0.27–4.2)
VLDLC SERPL CALC-MCNC: 27 MG/DL (ref 6–23)
WBC # BLD AUTO: 7.3 K/UL (ref 4.3–11.1)

## 2024-10-15 ENCOUNTER — PATIENT MESSAGE (OUTPATIENT)
Dept: INTERNAL MEDICINE CLINIC | Facility: CLINIC | Age: 34
End: 2024-10-15

## 2024-10-16 RX ORDER — BUPROPION HYDROCHLORIDE 300 MG/1
300 TABLET ORAL EVERY MORNING
Qty: 30 TABLET | Refills: 2 | Status: SHIPPED | OUTPATIENT
Start: 2024-10-16

## 2024-10-16 NOTE — TELEPHONE ENCOUNTER
Please advise patient to make sure she is taking her vitamin D with a meal that contains fat like salad dressing, olive oil, peanut butter, cheese, etc...  Vitamin D won't absorb well on an empty stomach or food without fat.      Okay to increase Wellbutrin XL from 150 mg to 300 mg daily #30 x 2.  Schedule a telehealth visit in one month F/U depression on higher dose Wellbutrin.

## 2024-10-16 NOTE — TELEPHONE ENCOUNTER
Requested Prescriptions     Pending Prescriptions Disp Refills    buPROPion (WELLBUTRIN XL) 300 MG extended release tablet 30 tablet 2     Sig: Take 1 tablet by mouth every morning     Dose verified and to [facility],

## 2025-02-03 RX ORDER — BUPROPION HYDROCHLORIDE 300 MG/1
300 TABLET ORAL EVERY MORNING
Qty: 30 TABLET | Refills: 2 | Status: SHIPPED | OUTPATIENT
Start: 2025-02-03

## 2025-02-03 NOTE — TELEPHONE ENCOUNTER
Requested Prescriptions     Pending Prescriptions Disp Refills    buPROPion (WELLBUTRIN XL) 300 MG extended release tablet [Pharmacy Med Name: BUPROPION  MG TAB] 30 tablet 2     Sig: TAKE ONE TABLET BY MOUTH EVERY MORNING     Dose verified and to [facility]. Patient is scheduled for follow up visit.

## 2025-03-22 SDOH — ECONOMIC STABILITY: INCOME INSECURITY: IN THE LAST 12 MONTHS, WAS THERE A TIME WHEN YOU WERE NOT ABLE TO PAY THE MORTGAGE OR RENT ON TIME?: YES

## 2025-03-22 SDOH — ECONOMIC STABILITY: FOOD INSECURITY: WITHIN THE PAST 12 MONTHS, THE FOOD YOU BOUGHT JUST DIDN'T LAST AND YOU DIDN'T HAVE MONEY TO GET MORE.: NEVER TRUE

## 2025-03-22 SDOH — ECONOMIC STABILITY: TRANSPORTATION INSECURITY
IN THE PAST 12 MONTHS, HAS LACK OF TRANSPORTATION KEPT YOU FROM MEETINGS, WORK, OR FROM GETTING THINGS NEEDED FOR DAILY LIVING?: YES

## 2025-03-22 SDOH — ECONOMIC STABILITY: TRANSPORTATION INSECURITY
IN THE PAST 12 MONTHS, HAS THE LACK OF TRANSPORTATION KEPT YOU FROM MEDICAL APPOINTMENTS OR FROM GETTING MEDICATIONS?: YES

## 2025-03-22 SDOH — ECONOMIC STABILITY: FOOD INSECURITY: WITHIN THE PAST 12 MONTHS, YOU WORRIED THAT YOUR FOOD WOULD RUN OUT BEFORE YOU GOT MONEY TO BUY MORE.: NEVER TRUE

## 2025-03-22 ASSESSMENT — PATIENT HEALTH QUESTIONNAIRE - PHQ9
10. IF YOU CHECKED OFF ANY PROBLEMS, HOW DIFFICULT HAVE THESE PROBLEMS MADE IT FOR YOU TO DO YOUR WORK, TAKE CARE OF THINGS AT HOME, OR GET ALONG WITH OTHER PEOPLE: SOMEWHAT DIFFICULT
4. FEELING TIRED OR HAVING LITTLE ENERGY: SEVERAL DAYS
5. POOR APPETITE OR OVEREATING: NOT AT ALL
8. MOVING OR SPEAKING SO SLOWLY THAT OTHER PEOPLE COULD HAVE NOTICED. OR THE OPPOSITE - BEING SO FIDGETY OR RESTLESS THAT YOU HAVE BEEN MOVING AROUND A LOT MORE THAN USUAL: NOT AT ALL
6. FEELING BAD ABOUT YOURSELF - OR THAT YOU ARE A FAILURE OR HAVE LET YOURSELF OR YOUR FAMILY DOWN: NOT AT ALL
1. LITTLE INTEREST OR PLEASURE IN DOING THINGS: NOT AT ALL
9. THOUGHTS THAT YOU WOULD BE BETTER OFF DEAD, OR OF HURTING YOURSELF: NOT AT ALL
8. MOVING OR SPEAKING SO SLOWLY THAT OTHER PEOPLE COULD HAVE NOTICED. OR THE OPPOSITE, BEING SO FIGETY OR RESTLESS THAT YOU HAVE BEEN MOVING AROUND A LOT MORE THAN USUAL: NOT AT ALL
2. FEELING DOWN, DEPRESSED OR HOPELESS: NOT AT ALL
SUM OF ALL RESPONSES TO PHQ QUESTIONS 1-9: 2
2. FEELING DOWN, DEPRESSED OR HOPELESS: NOT AT ALL
7. TROUBLE CONCENTRATING ON THINGS, SUCH AS READING THE NEWSPAPER OR WATCHING TELEVISION: NOT AT ALL
9. THOUGHTS THAT YOU WOULD BE BETTER OFF DEAD, OR OF HURTING YOURSELF: NOT AT ALL
SUM OF ALL RESPONSES TO PHQ QUESTIONS 1-9: 2
7. TROUBLE CONCENTRATING ON THINGS, SUCH AS READING THE NEWSPAPER OR WATCHING TELEVISION: NOT AT ALL
1. LITTLE INTEREST OR PLEASURE IN DOING THINGS: NOT AT ALL
SUM OF ALL RESPONSES TO PHQ QUESTIONS 1-9: 2
10. IF YOU CHECKED OFF ANY PROBLEMS, HOW DIFFICULT HAVE THESE PROBLEMS MADE IT FOR YOU TO DO YOUR WORK, TAKE CARE OF THINGS AT HOME, OR GET ALONG WITH OTHER PEOPLE: SOMEWHAT DIFFICULT
4. FEELING TIRED OR HAVING LITTLE ENERGY: SEVERAL DAYS
6. FEELING BAD ABOUT YOURSELF - OR THAT YOU ARE A FAILURE OR HAVE LET YOURSELF OR YOUR FAMILY DOWN: NOT AT ALL
5. POOR APPETITE OR OVEREATING: NOT AT ALL
3. TROUBLE FALLING OR STAYING ASLEEP: SEVERAL DAYS
SUM OF ALL RESPONSES TO PHQ QUESTIONS 1-9: 2
3. TROUBLE FALLING OR STAYING ASLEEP: SEVERAL DAYS
SUM OF ALL RESPONSES TO PHQ QUESTIONS 1-9: 2

## 2025-03-25 ENCOUNTER — OFFICE VISIT (OUTPATIENT)
Dept: INTERNAL MEDICINE CLINIC | Facility: CLINIC | Age: 35
End: 2025-03-25
Payer: COMMERCIAL

## 2025-03-25 VITALS
DIASTOLIC BLOOD PRESSURE: 80 MMHG | HEIGHT: 62 IN | WEIGHT: 196.2 LBS | HEART RATE: 80 BPM | SYSTOLIC BLOOD PRESSURE: 120 MMHG | BODY MASS INDEX: 36.1 KG/M2

## 2025-03-25 DIAGNOSIS — F32.A DEPRESSION, UNSPECIFIED DEPRESSION TYPE: ICD-10-CM

## 2025-03-25 DIAGNOSIS — B00.9 HSV-2 INFECTION: ICD-10-CM

## 2025-03-25 DIAGNOSIS — E55.9 VITAMIN D DEFICIENCY: ICD-10-CM

## 2025-03-25 DIAGNOSIS — Z00.00 ENCOUNTER FOR PREVENTIVE HEALTH EXAMINATION: Chronic | ICD-10-CM

## 2025-03-25 DIAGNOSIS — N62 LARGE BREASTS: ICD-10-CM

## 2025-03-25 DIAGNOSIS — K21.9 GASTROESOPHAGEAL REFLUX DISEASE WITHOUT ESOPHAGITIS: ICD-10-CM

## 2025-03-25 DIAGNOSIS — G89.29 CHRONIC UPPER BACK PAIN: Primary | ICD-10-CM

## 2025-03-25 DIAGNOSIS — M54.9 CHRONIC UPPER BACK PAIN: Primary | ICD-10-CM

## 2025-03-25 PROCEDURE — 99213 OFFICE O/P EST LOW 20 MIN: CPT | Performed by: INTERNAL MEDICINE

## 2025-03-25 ASSESSMENT — ENCOUNTER SYMPTOMS
STRIDOR: 0
VOICE CHANGE: 0
RECTAL PAIN: 0
EYE PAIN: 0

## 2025-03-25 NOTE — PROGRESS NOTES
FOLLOWUP VISIT    Subjective:    Ms. Taylor is a 34 y.o., female,   Chief Complaint   Patient presents with    6 Month Follow-Up    Back Pain       HPI:    Patient presents today for follow up of two or more chronic medical problems and review of labs.       The patient has GERD.  The patient has been on attempted therapeutic lifestyle change including smaller more frequent meals and avoiding caffeine.  The patient states that the GERD symptoms have been well controlled on current treatment and denies any dysphagia.       She has had no HSV on suppressive Valtrex.     She complains of chronic upper back / neck pain and poor posture due to her large breasts.  Would like to discuss breast reduction.     The following portions of the patient's history were reviewed and updated as appropriate:      Past Medical History:   Diagnosis Date    Gastroesophageal reflux disease without esophagitis 8/10/2022    Symptoms controlled by Pepcid.     GERD (gastroesophageal reflux disease)     History of pancreatitis 8/10/2022    She had severe idiopathic pancreatitis in 2020 while living in [place].  Lipase was > 10,000.  She was hospitalized.  She saw gastroenterologists.  She was not taking any medications.  She was not drinking alcohol.  Imaging studies negative for gallbladder disease (US / CT / HIDA scan).  MRCP was negative.  Cause undetermined.      History of positive PPD     Completed 6 months INH age 18. Raised in [place] age 3-12.    HSV-2 infection     Idiopathic pancreatitis     Infertility, female 2017    due to PCOS    PCOS (polycystic ovarian syndrome)     Tachycardia     Pregnancy induced    Vitamin D deficiency        Past Surgical History:   Procedure Laterality Date    TONSILLECTOMY      WISDOM TOOTH EXTRACTION         Family History   Problem Relation Age of Onset    Migraines Mother     Migraines Sister     Breast Cancer Neg Hx     Colon Cancer Neg Hx     Ovarian Cancer Neg Hx     Uterine Cancer Neg Hx

## 2025-05-20 RX ORDER — BUPROPION HYDROCHLORIDE 300 MG/1
300 TABLET ORAL EVERY MORNING
Qty: 30 TABLET | Refills: 2 | Status: SHIPPED | OUTPATIENT
Start: 2025-05-20

## 2025-07-08 ENCOUNTER — HOSPITAL ENCOUNTER (EMERGENCY)
Age: 35
Discharge: HOME | End: 2025-07-09
Attending: EMERGENCY MEDICINE
Payer: COMMERCIAL

## 2025-07-08 DIAGNOSIS — R10.13 ABDOMINAL PAIN, EPIGASTRIC: Primary | ICD-10-CM

## 2025-07-08 DIAGNOSIS — R11.2 NAUSEA AND VOMITING, UNSPECIFIED VOMITING TYPE: ICD-10-CM

## 2025-07-08 PROCEDURE — 99284 EMERGENCY DEPT VISIT MOD MDM: CPT

## 2025-07-08 ASSESSMENT — PAIN DESCRIPTION - DESCRIPTORS: DESCRIPTORS: SPASM;DULL

## 2025-07-08 ASSESSMENT — LIFESTYLE VARIABLES
HOW MANY STANDARD DRINKS CONTAINING ALCOHOL DO YOU HAVE ON A TYPICAL DAY: 1 OR 2
HOW OFTEN DO YOU HAVE A DRINK CONTAINING ALCOHOL: MONTHLY OR LESS

## 2025-07-08 ASSESSMENT — PAIN DESCRIPTION - ORIENTATION: ORIENTATION: MID;UPPER

## 2025-07-08 ASSESSMENT — PAIN SCALES - GENERAL: PAINLEVEL_OUTOF10: 3

## 2025-07-08 ASSESSMENT — PAIN - FUNCTIONAL ASSESSMENT: PAIN_FUNCTIONAL_ASSESSMENT: 0-10

## 2025-07-08 ASSESSMENT — PAIN DESCRIPTION - LOCATION: LOCATION: ABDOMEN

## 2025-07-09 VITALS
SYSTOLIC BLOOD PRESSURE: 123 MMHG | TEMPERATURE: 97.9 F | HEART RATE: 97 BPM | OXYGEN SATURATION: 97 % | HEIGHT: 62 IN | WEIGHT: 195.2 LBS | DIASTOLIC BLOOD PRESSURE: 76 MMHG | RESPIRATION RATE: 16 BRPM | BODY MASS INDEX: 35.92 KG/M2

## 2025-07-09 LAB
ALBUMIN SERPL-MCNC: 4.6 G/DL (ref 3.5–5)
ALBUMIN/GLOB SERPL: 1.6 (ref 1–1.9)
ALP SERPL-CCNC: 90 U/L (ref 35–104)
ALT SERPL-CCNC: 8 U/L (ref 12–65)
ANION GAP SERPL CALC-SCNC: 14 MMOL/L (ref 7–16)
APPEARANCE UR: CLEAR
AST SERPL-CCNC: 17 U/L (ref 15–37)
BACTERIA URNS QL MICRO: 0 /HPF
BASOPHILS # BLD: 0.04 K/UL (ref 0–0.2)
BASOPHILS NFR BLD: 0.3 % (ref 0–2)
BILIRUB SERPL-MCNC: 0.7 MG/DL (ref 0–1.2)
BILIRUB UR QL: NEGATIVE
BUN SERPL-MCNC: 10 MG/DL (ref 6–23)
CALCIUM SERPL-MCNC: 9.5 MG/DL (ref 8.8–10.2)
CHLORIDE SERPL-SCNC: 104 MMOL/L (ref 98–107)
CO2 SERPL-SCNC: 23 MMOL/L (ref 20–29)
COLOR UR: YELLOW
CREAT SERPL-MCNC: 0.89 MG/DL (ref 0.8–1.3)
DIFFERENTIAL METHOD BLD: ABNORMAL
EOSINOPHIL # BLD: 0.08 K/UL (ref 0–0.8)
EOSINOPHIL NFR BLD: 0.5 % (ref 0.5–7.8)
EPI CELLS #/AREA URNS HPF: NORMAL /HPF
ERYTHROCYTE [DISTWIDTH] IN BLOOD BY AUTOMATED COUNT: 11.8 % (ref 11.9–14.6)
GLOBULIN SER CALC-MCNC: 2.9 G/DL (ref 2.3–3.5)
GLUCOSE SERPL-MCNC: 127 MG/DL (ref 65–100)
GLUCOSE UR STRIP.AUTO-MCNC: NEGATIVE MG/DL
HCG UR QL: NEGATIVE
HCT VFR BLD AUTO: 44.3 % (ref 35.8–46.3)
HGB BLD-MCNC: 15.6 G/DL (ref 11.7–15.4)
HGB UR QL STRIP: ABNORMAL
IMM GRANULOCYTES # BLD AUTO: 0.04 K/UL (ref 0–0.5)
IMM GRANULOCYTES NFR BLD AUTO: 0.3 % (ref 0–5)
KETONES UR QL STRIP.AUTO: 15 MG/DL
LEUKOCYTE ESTERASE UR QL STRIP.AUTO: NEGATIVE
LIPASE SERPL-CCNC: 33 U/L (ref 13–60)
LYMPHOCYTES # BLD: 1.44 K/UL (ref 0.5–4.6)
LYMPHOCYTES NFR BLD: 9.8 % (ref 13–44)
MCH RBC QN AUTO: 32.4 PG (ref 26.1–32.9)
MCHC RBC AUTO-ENTMCNC: 35.2 G/DL (ref 31.4–35)
MCV RBC AUTO: 92.1 FL (ref 82–102)
MONOCYTES # BLD: 0.64 K/UL (ref 0.1–1.3)
MONOCYTES NFR BLD: 4.3 % (ref 4–12)
MUCOUS THREADS URNS QL MICRO: 0 /LPF
NEUTS SEG # BLD: 12.51 K/UL (ref 1.7–8.2)
NEUTS SEG NFR BLD: 84.8 % (ref 43–78)
NITRITE UR QL STRIP.AUTO: NEGATIVE
NRBC # BLD: 0 K/UL (ref 0–0.2)
OTHER OBSERVATIONS: NORMAL
PH UR STRIP: 8.5 (ref 5–9)
PLATELET # BLD AUTO: 286 K/UL (ref 150–450)
PMV BLD AUTO: 10.1 FL (ref 9.4–12.3)
POTASSIUM SERPL-SCNC: 4 MMOL/L (ref 3.5–5.1)
PROT SERPL-MCNC: 7.5 G/DL (ref 6.3–8.2)
PROT UR STRIP-MCNC: 30 MG/DL
RBC # BLD AUTO: 4.81 M/UL (ref 4.05–5.2)
RBC #/AREA URNS HPF: NORMAL /HPF
SODIUM SERPL-SCNC: 141 MMOL/L (ref 133–143)
SP GR UR REFRACTOMETRY: 1.02 (ref 1–1.02)
UROBILINOGEN UR QL STRIP.AUTO: 1 EU/DL (ref 0.2–1)
WBC # BLD AUTO: 14.8 K/UL (ref 4.3–11.1)
WBC URNS QL MICRO: 0 /HPF

## 2025-07-09 PROCEDURE — 6360000002 HC RX W HCPCS: Performed by: EMERGENCY MEDICINE

## 2025-07-09 PROCEDURE — 81001 URINALYSIS AUTO W/SCOPE: CPT

## 2025-07-09 PROCEDURE — 96361 HYDRATE IV INFUSION ADD-ON: CPT

## 2025-07-09 PROCEDURE — 83690 ASSAY OF LIPASE: CPT

## 2025-07-09 PROCEDURE — 85025 COMPLETE CBC W/AUTO DIFF WBC: CPT

## 2025-07-09 PROCEDURE — 80053 COMPREHEN METABOLIC PANEL: CPT

## 2025-07-09 PROCEDURE — 96374 THER/PROPH/DIAG INJ IV PUSH: CPT

## 2025-07-09 PROCEDURE — 2500000003 HC RX 250 WO HCPCS: Performed by: EMERGENCY MEDICINE

## 2025-07-09 PROCEDURE — 2580000003 HC RX 258: Performed by: EMERGENCY MEDICINE

## 2025-07-09 PROCEDURE — 96375 TX/PRO/DX INJ NEW DRUG ADDON: CPT

## 2025-07-09 PROCEDURE — 81025 URINE PREGNANCY TEST: CPT

## 2025-07-09 RX ORDER — ONDANSETRON 4 MG/1
4 TABLET, ORALLY DISINTEGRATING ORAL 3 TIMES DAILY PRN
Qty: 15 TABLET | Refills: 0 | Status: SHIPPED | OUTPATIENT
Start: 2025-07-09

## 2025-07-09 RX ORDER — ONDANSETRON 2 MG/ML
4 INJECTION INTRAMUSCULAR; INTRAVENOUS
Status: COMPLETED | OUTPATIENT
Start: 2025-07-09 | End: 2025-07-09

## 2025-07-09 RX ORDER — KETOROLAC TROMETHAMINE 15 MG/ML
15 INJECTION, SOLUTION INTRAMUSCULAR; INTRAVENOUS
Status: COMPLETED | OUTPATIENT
Start: 2025-07-09 | End: 2025-07-09

## 2025-07-09 RX ORDER — 0.9 % SODIUM CHLORIDE 0.9 %
1000 INTRAVENOUS SOLUTION INTRAVENOUS
Status: COMPLETED | OUTPATIENT
Start: 2025-07-09 | End: 2025-07-09

## 2025-07-09 RX ORDER — HYOSCYAMINE SULFATE 0.12 MG/1
0.12 TABLET ORAL EVERY 4 HOURS PRN
Qty: 15 TABLET | Refills: 0 | Status: SHIPPED | OUTPATIENT
Start: 2025-07-09

## 2025-07-09 RX ORDER — HYDROMORPHONE HYDROCHLORIDE 1 MG/ML
0.5 INJECTION, SOLUTION INTRAMUSCULAR; INTRAVENOUS; SUBCUTANEOUS
Status: COMPLETED | OUTPATIENT
Start: 2025-07-09 | End: 2025-07-09

## 2025-07-09 RX ORDER — FAMOTIDINE 20 MG/1
20 TABLET, FILM COATED ORAL 2 TIMES DAILY
Qty: 28 TABLET | Refills: 0 | Status: SHIPPED | OUTPATIENT
Start: 2025-07-09 | End: 2025-07-23

## 2025-07-09 RX ADMIN — FAMOTIDINE 20 MG: 10 INJECTION, SOLUTION INTRAVENOUS at 00:32

## 2025-07-09 RX ADMIN — ONDANSETRON 4 MG: 2 INJECTION INTRAMUSCULAR; INTRAVENOUS at 00:32

## 2025-07-09 RX ADMIN — HYDROMORPHONE HYDROCHLORIDE 0.5 MG: 1 INJECTION, SOLUTION INTRAMUSCULAR; INTRAVENOUS; SUBCUTANEOUS at 00:33

## 2025-07-09 RX ADMIN — KETOROLAC TROMETHAMINE 15 MG: 15 INJECTION, SOLUTION INTRAMUSCULAR; INTRAVENOUS at 01:49

## 2025-07-09 RX ADMIN — SODIUM CHLORIDE 1000 ML: 0.9 INJECTION, SOLUTION INTRAVENOUS at 00:31

## 2025-07-09 ASSESSMENT — PAIN SCALES - GENERAL
PAINLEVEL_OUTOF10: 5
PAINLEVEL_OUTOF10: 0

## 2025-07-09 ASSESSMENT — PAIN - FUNCTIONAL ASSESSMENT: PAIN_FUNCTIONAL_ASSESSMENT: 0-10

## 2025-07-09 NOTE — DISCHARGE INSTRUCTIONS
Pepcid twice daily for 2 weeks.  Zofran every 8 hours as needed for nausea and vomiting.  Levsin every 4-6 hours as needed for abdominal pain and cramping.  Tylenol and Motrin for pain as needed as well.  Sleep tonight and start with fluids in the morning and advance diet as tolerated.  Return in 12 to 24 hours if any pain persist or sooner if fever, migration of pain to the right lower quadrant or uncontrolled pain.  See your doctor later this week or early next week for follow-up and recheck

## 2025-07-09 NOTE — ED PROVIDER NOTES
the Last Year: Yes        Previous Medications    BUPROPION (WELLBUTRIN XL) 300 MG EXTENDED RELEASE TABLET    Take 1 tablet by mouth every morning    VALACYCLOVIR (VALTREX) 500 MG TABLET    Take 1 tablet by mouth 2 times daily        Results from this emergency department visit:      Results for orders placed or performed during the hospital encounter of 07/08/25   CBC with Auto Differential   Result Value Ref Range    WBC 14.8 (H) 4.3 - 11.1 K/uL    RBC 4.81 4.05 - 5.20 M/uL    Hemoglobin 15.6 (H) 11.7 - 15.4 g/dL    Hematocrit 44.3 35.8 - 46.3 %    MCV 92.1 82.0 - 102.0 FL    MCH 32.4 26.1 - 32.9 PG    MCHC 35.2 (H) 31.4 - 35.0 g/dL    RDW 11.8 (L) 11.9 - 14.6 %    Platelets 286 150 - 450 K/uL    MPV 10.1 9.4 - 12.3 FL    nRBC 0.00 0.0 - 0.2 K/uL    Differential Type AUTOMATED      Neutrophils % 84.8 (H) 43.0 - 78.0 %    Lymphocytes % 9.8 (L) 13.0 - 44.0 %    Monocytes % 4.3 4.0 - 12.0 %    Eosinophils % 0.5 0.5 - 7.8 %    Basophils % 0.3 0.0 - 2.0 %    Immature Granulocytes % 0.3 0.0 - 5.0 %    Neutrophils Absolute 12.51 (H) 1.70 - 8.20 K/UL    Lymphocytes Absolute 1.44 0.50 - 4.60 K/UL    Monocytes Absolute 0.64 0.10 - 1.30 K/UL    Eosinophils Absolute 0.08 0.00 - 0.80 K/UL    Basophils Absolute 0.04 0.00 - 0.20 K/UL    Immature Granulocytes Absolute 0.04 0.0 - 0.5 K/UL   Comprehensive Metabolic Panel   Result Value Ref Range    Sodium 141 133 - 143 mmol/L    Potassium 4.0 3.5 - 5.1 mmol/L    Chloride 104 98 - 107 mmol/L    CO2 23 20 - 29 mmol/L    Anion Gap 14 7 - 16 mmol/L    Glucose 127 (H) 65 - 100 mg/dL    BUN 10 6 - 23 MG/DL    Creatinine 0.89 0.80 - 1.30 MG/DL    Est, Glom Filt Rate 87 >60 ml/min/1.73m2    Calcium 9.5 8.8 - 10.2 MG/DL    Total Bilirubin 0.7 0.0 - 1.2 MG/DL    ALT 8 (L) 12 - 65 U/L    AST 17 15 - 37 U/L    Alk Phosphatase 90 35 - 104 U/L    Total Protein 7.5 6.3 - 8.2 g/dL    Albumin 4.6 3.5 - 5.0 g/dL    Globulin 2.9 2.3 - 3.5 g/dL    Albumin/Globulin Ratio 1.6 1.0 - 1.9     Lipase

## 2025-07-09 NOTE — ED NOTES
I have reviewed discharge instructions with the patient.  The patient verbalized understanding.    Patient left ED via Discharge Method: ambulatory to Home with significant other.    Opportunity for questions and clarification provided.       Patient given 3 scripts. E-scribed to [facility].

## 2025-07-09 NOTE — ED TRIAGE NOTES
Patient verified by name and  prior to triage. Pt presents to the ER with steady gait.  Pt accompianed by family.  Pt and/or family reports upper epigastric pain with associated diarrhea since 3 pm today.  Pt reports hx of pancreatitis 5 years ago.

## 2025-07-15 ENCOUNTER — OFFICE VISIT (OUTPATIENT)
Dept: INTERNAL MEDICINE CLINIC | Facility: CLINIC | Age: 35
End: 2025-07-15
Payer: COMMERCIAL

## 2025-07-15 VITALS
HEIGHT: 62 IN | SYSTOLIC BLOOD PRESSURE: 110 MMHG | HEART RATE: 79 BPM | DIASTOLIC BLOOD PRESSURE: 70 MMHG | WEIGHT: 193 LBS | BODY MASS INDEX: 35.51 KG/M2

## 2025-07-15 DIAGNOSIS — R19.8 GASTROINTESTINAL SYMPTOMS: Primary | ICD-10-CM

## 2025-07-15 PROCEDURE — 99213 OFFICE O/P EST LOW 20 MIN: CPT | Performed by: INTERNAL MEDICINE

## 2025-07-15 ASSESSMENT — ENCOUNTER SYMPTOMS
EYE PAIN: 0
RECTAL PAIN: 0
VOICE CHANGE: 0
STRIDOR: 0

## 2025-07-15 NOTE — PROGRESS NOTES
of Times Moved in the Last Year: 0     Homeless in the Last Year: Yes       Current Outpatient Medications   Medication Sig Dispense Refill    famotidine (PEPCID) 20 MG tablet Take 1 tablet by mouth 2 times daily for 14 days 28 tablet 0    ondansetron (ZOFRAN-ODT) 4 MG disintegrating tablet Take 1 tablet by mouth 3 times daily as needed for Nausea or Vomiting 15 tablet 0    hyoscyamine (ANASPAZ;LEVSIN) 0.125 MG tablet Take 1 tablet by mouth every 4 hours as needed for Cramping 15 tablet 0    buPROPion (WELLBUTRIN XL) 300 MG extended release tablet Take 1 tablet by mouth every morning 30 tablet 2    valACYclovir (VALTREX) 500 MG tablet Take 1 tablet by mouth 2 times daily 60 tablet 11     No current facility-administered medications for this visit.       Allergies as of 07/15/2025 - Fully Reviewed 07/15/2025   Allergen Reaction Noted    Cefaclor Hives 08/03/2022       Review of Systems   Constitutional:  Negative for activity change and appetite change.   HENT:  Negative for drooling and voice change.    Eyes:  Negative for pain.   Respiratory:  Negative for stridor.    Gastrointestinal:  Negative for rectal pain.   Endocrine: Negative for polydipsia and polyphagia.   Genitourinary:  Negative for dyspareunia and enuresis.   Musculoskeletal:  Negative for gait problem and neck stiffness.   Skin:  Negative for pallor.   Neurological:  Negative for facial asymmetry and speech difficulty.   Hematological:  Does not bruise/bleed easily.   Psychiatric/Behavioral:  Negative for self-injury. The patient is not hyperactive.             Patient Care Team:  Jose Juan Nichols MD as PCP - General (Internal Medicine)  Jose Juan Nichols MD as PCP - Empaneled Provider  Not, On File (Inactive)    Objective:    /70   Pulse 79   Ht 1.575 m (5' 2\")   Wt 87.5 kg (193 lb)   BMI 35.30 kg/m²     Physical Exam  Vitals reviewed.   Constitutional:       General: She is not in acute distress.     Appearance: She is not toxic-appearing.   HENT:

## 2025-09-04 RX ORDER — BUPROPION HYDROCHLORIDE 300 MG/1
300 TABLET ORAL EVERY MORNING
Qty: 30 TABLET | Refills: 2 | Status: CANCELLED | OUTPATIENT
Start: 2025-09-04

## 2025-09-05 RX ORDER — BUPROPION HYDROCHLORIDE 300 MG/1
300 TABLET ORAL EVERY MORNING
Qty: 90 TABLET | Refills: 0 | Status: SHIPPED | OUTPATIENT
Start: 2025-09-05